# Patient Record
Sex: FEMALE | Race: WHITE | NOT HISPANIC OR LATINO | Employment: OTHER | URBAN - METROPOLITAN AREA
[De-identification: names, ages, dates, MRNs, and addresses within clinical notes are randomized per-mention and may not be internally consistent; named-entity substitution may affect disease eponyms.]

---

## 2018-09-21 ENCOUNTER — OFFICE VISIT (OUTPATIENT)
Dept: MEDICAL GROUP | Facility: MEDICAL CENTER | Age: 43
End: 2018-09-21
Attending: FAMILY MEDICINE
Payer: MEDICAID

## 2018-09-21 VITALS
OXYGEN SATURATION: 98 % | HEIGHT: 66 IN | SYSTOLIC BLOOD PRESSURE: 102 MMHG | TEMPERATURE: 97.3 F | BODY MASS INDEX: 24.75 KG/M2 | DIASTOLIC BLOOD PRESSURE: 60 MMHG | RESPIRATION RATE: 16 BRPM | WEIGHT: 154 LBS | HEART RATE: 84 BPM

## 2018-09-21 DIAGNOSIS — M25.551 RIGHT HIP PAIN: ICD-10-CM

## 2018-09-21 DIAGNOSIS — M54.50 CHRONIC LOW BACK PAIN WITHOUT SCIATICA, UNSPECIFIED BACK PAIN LATERALITY: ICD-10-CM

## 2018-09-21 DIAGNOSIS — N63.0 BREAST NODULE: ICD-10-CM

## 2018-09-21 DIAGNOSIS — G89.29 CHRONIC LOW BACK PAIN WITHOUT SCIATICA, UNSPECIFIED BACK PAIN LATERALITY: ICD-10-CM

## 2018-09-21 PROCEDURE — 99203 OFFICE O/P NEW LOW 30 MIN: CPT | Performed by: FAMILY MEDICINE

## 2018-09-21 PROCEDURE — 99204 OFFICE O/P NEW MOD 45 MIN: CPT | Performed by: FAMILY MEDICINE

## 2018-09-21 ASSESSMENT — PATIENT HEALTH QUESTIONNAIRE - PHQ9: CLINICAL INTERPRETATION OF PHQ2 SCORE: 0

## 2018-09-21 NOTE — ASSESSMENT & PLAN NOTE
Patient notes 3 month history of persistent achiness in the lateral posterior aspect of her right hip. There is no recent history of trauma. She does not express pain consistently radiating down her right leg.she is not experiencing numbness in the right leg.

## 2018-09-21 NOTE — PROGRESS NOTES
Chief Complaint   Patient presents with   • Establish Care   • Breast Mass   • Back Pain         HISTORY OF THE PRESENT ILLNESS: Patient is a 42 y.o. female. This pleasant patient is here today to establish care, and be evaluated for persistent lumbosacral pain, recent right hip pain, new onset left breast nodule      Chronic low back pain without sciatica  Patient reports a 20+ her history of persistent recurrent pain in her lumbar and sacral midline spinal areas. Tends to radiate towards her right buttock. She reports very rarely she might have brief burning on the anterior proximal right thigh area and she was evaluated with plain x-rays at age 23 and told that she had scoliosis. She also experiences low back muscle spasms. She was also told she had fairly severe arthritic change back when she was 23 with apparently even bone spur formation being observed. She also reports intermittent feelings of snapping in her lower spine area when she is walking. She does not have walker urinary incontinence but does have suggestion of stress urinary incontinence. There is no fecal incontinence.    Right hip pain  Patient notes 3 month history of persistent achiness in the lateral posterior aspect of her right hip. There is no recent history of trauma. She does not express pain consistently radiating down her right leg.she is not experiencing numbness in the right leg.    Breast nodule  Patient noted about 3 weeks ago a small pea-sized nodule in the upper part of her left breast just beyond the nipple. It has not significantly changed in size there has been no nipple discharge. Family history is notable for breast cancer in her mom. Patient does not notice any persistent breast tenderness other than menstrually related bilaterally.    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    Current Outpatient Prescriptions Ordered in [facility] [employer]   Medication Sig Dispense  Refill   • hydrocodone-acetaminophen (NORCO) 5-325 MG Tab per tablet Take 1 Tab by mouth every 6 hours as needed (for pain). Do not drive or drink alcohol or engaged in potentially hazardous activity while taking this medication 20 Tab 0     No current Epic-ordered facility-administered medications on file.        No past medical history on file.    Past Surgical History:   Procedure Laterality Date   • PRIMARY C SECTION      x3       Social History   Substance Use Topics   • Smoking status: Current Every Day Smoker     Packs/day: 0.75   • Smokeless tobacco: Never Used   • Alcohol use Yes      Comment: rarely       Family Status   Relation Status   • Mo (Not Specified)   • Fa (Not Specified)   • MUnc (Not Specified)   • Neg Hx (Not Specified)     Family History   Problem Relation Age of Onset   • Cancer Mother 42        breast   • Diabetes Mother    • Stroke Father    • Diabetes Maternal Uncle    • Heart Disease Neg Hx        Review of Systems   Constitutional: Negative for fever, chills, weight loss and malaise/fatigue.   HENT: Negative for ear pain, nosebleeds, congestion, sore throat and neck pain.    Eyes: Negative for blurred vision.   Respiratory: Negative for cough, sputum production, shortness of breath and wheezing.    Cardiovascular: Negative for chest pain, palpitations, orthopnea and leg swelling.   Gastrointestinal: Negative for heartburn, nausea, vomiting and abdominal pain.   Genitourinary: Negative for dysuria, urgency and frequency.   Musculoskeletal: Positive for right hip myalgia, back pain .   Skin: Negative for rash and itching.   Neurological: Negative for dizziness, tingling, tremors, sensory change, focal weakness and headaches.   Endo/Heme/Allergies: Does not bruise/bleed easily.   Psychiatric/Behavioral: Negative for depression, anxiety, or memory loss.            Exam: Blood pressure 102/60, pulse 84, temperature 36.3 °C (97.3 °F), temperature source Temporal, resp. rate 16, height 1.676 m  "(5' 6\"), weight 69.9 kg (154 lb), SpO2 98 %.  General: Normal appearing. No distress.  HEENT: Normocephalic. Eyes conjunctiva clear lids without ptosis, pupils equal and reactive to light accommodation, ears normal shape and contour, canals are clear bilaterally, tympanic membranes are benign, nasal mucosa benign, oropharynx is without erythema, edema or exudates.   Neck: Supple without JVD or bruit. Thyroid is not enlarged.  Pulmonary: Clear to ausculation.  Normal effort. No rales, ronchi, or wheezing.  Breast-4 mm subcutaneous nodule noted at 12:30 above the left areola. No overlying skin changes. No other palpable breast abnormality. No axillary adenopathy or tenderness either side  Cardiovascular: Regular rate and rhythm without murmur. Carotid and radial pulses are intact and equal bilaterally.  Abdomen: Soft, nontender, nondistended. Normal bowel sounds. Liver and spleen are not palpable  Neurologic: Intact light touch and strength bilaterally,normal speech, no tremor, normal gait.   Lymph: No cervical, supraclavicular or axillary lymph nodes are palpable  Skin: Warm and dry.  No obvious lesions.  Musculoskeletal: Normal gait. No extremity cyanosis, clubbing, or edema.  Psych: Normal mood and affect. Alert and oriented x3. Judgment and insight is normal.  Back-mildly tender in the midline from L4-S4. No palpable tenderness at the right SI joint this morning. 1+ tender over the lateral right hip  Please note that this dictation was created using voice recognition software. I have made every reasonable attempt to correct obvious errors, but I expect that there are errors of grammar and possibly content that I did not discover before finalizing the note.      Assessment/Plan  1. Breast nodule  MA-DIAGNOSTIC MAMMO W/O CAD-BILAT   2. Right hip pain  DX-HIP-BILATERAL-WITH PELVIS-2 VIEWS   3. Chronic low back pain without sciatica, unspecified back pain laterality  DX-LUMBAR SPINE-4+ VIEWS    REFERRAL TO PHYSICAL " THERAPY Reason for Therapy: Eval/Treat/Report     Plan: 1. LS-spine x-ray  2. Trial of physical therapy  3. Bilateral hip x-rays  4. Diagnostic mammogram  5. Revisit in 3 weeks

## 2018-09-21 NOTE — ASSESSMENT & PLAN NOTE
Patient noted about 3 weeks ago a small pea-sized nodule in the upper part of her left breast just beyond the nipple. It has not significantly changed in size there has been no nipple discharge. Family history is notable for breast cancer in her mom. Patient does not notice any persistent breast tenderness other than menstrually related bilaterally.

## 2018-09-21 NOTE — ASSESSMENT & PLAN NOTE
Patient reports a 20+ her history of persistent recurrent pain in her lumbar and sacral midline spinal areas. Tends to radiate towards her right buttock. She reports very rarely she might have brief burning on the anterior proximal right thigh area and she was evaluated with plain x-rays at age 23 and told that she had scoliosis. She also experiences low back muscle spasms. She was also told she had fairly severe arthritic change back when she was 23 with apparently even bone spur formation being observed. She also reports intermittent feelings of snapping in her lower spine area when she is walking. She does not have walker urinary incontinence but does have suggestion of stress urinary incontinence. There is no fecal incontinence.

## 2018-09-25 ENCOUNTER — HOSPITAL ENCOUNTER (OUTPATIENT)
Dept: RADIOLOGY | Facility: MEDICAL CENTER | Age: 43
End: 2018-09-25
Attending: FAMILY MEDICINE
Payer: MEDICAID

## 2018-09-25 DIAGNOSIS — M54.50 CHRONIC LOW BACK PAIN WITHOUT SCIATICA, UNSPECIFIED BACK PAIN LATERALITY: ICD-10-CM

## 2018-09-25 DIAGNOSIS — M25.551 RIGHT HIP PAIN: ICD-10-CM

## 2018-09-25 DIAGNOSIS — G89.29 CHRONIC LOW BACK PAIN WITHOUT SCIATICA, UNSPECIFIED BACK PAIN LATERALITY: ICD-10-CM

## 2018-09-25 PROCEDURE — 72110 X-RAY EXAM L-2 SPINE 4/>VWS: CPT

## 2018-09-25 PROCEDURE — 73521 X-RAY EXAM HIPS BI 2 VIEWS: CPT

## 2018-09-27 ENCOUNTER — HOSPITAL ENCOUNTER (OUTPATIENT)
Dept: RADIOLOGY | Facility: MEDICAL CENTER | Age: 43
End: 2018-09-27
Attending: FAMILY MEDICINE
Payer: MEDICAID

## 2018-09-27 DIAGNOSIS — N63.0 BREAST NODULE: ICD-10-CM

## 2018-09-27 PROCEDURE — G0279 TOMOSYNTHESIS, MAMMO: HCPCS

## 2018-09-27 PROCEDURE — 76642 ULTRASOUND BREAST LIMITED: CPT | Mod: RT

## 2018-09-28 ENCOUNTER — TELEPHONE (OUTPATIENT)
Dept: MEDICAL GROUP | Facility: MEDICAL CENTER | Age: 43
End: 2018-09-28

## 2018-09-28 NOTE — TELEPHONE ENCOUNTER
----- Message from Girma Vasques M.D. sent at 9/27/2018  6:22 PM PDT -----  Spine x-rays are notable for small joint arthritis with very slight movement of L1 on L2 which does not appear significant as a pain generator.

## 2018-09-28 NOTE — TELEPHONE ENCOUNTER
----- Message from Girma Vasques M.D. sent at 9/27/2018  6:20 PM PDT -----  Hip x-rays look unremarkable

## 2018-09-28 NOTE — TELEPHONE ENCOUNTER
1. Caller Name: Lizette Baez                                           Call Back Number: [phone] (home)         Patient approves a detailed voicemail message: yes    Left voice mail to call us back

## 2018-10-01 NOTE — TELEPHONE ENCOUNTER
Facet arthropathy means arthritic changes of the small joints that exist at each vertebral level in the spine

## 2018-10-02 ENCOUNTER — TELEPHONE (OUTPATIENT)
Dept: MEDICAL GROUP | Facility: MEDICAL CENTER | Age: 43
End: 2018-10-02

## 2018-10-02 NOTE — TELEPHONE ENCOUNTER
1. Caller Name: Lizette Baez                                           Call Back Number: [phone] (home)         Patient approves a detailed voicemail message: yes    Left message for Pt to call back

## 2018-10-02 NOTE — TELEPHONE ENCOUNTER
1. Caller Name: Lizette Baez                                         Call Back Number: [phone] (home)         Patient approves a detailed voicemail message: no    Left message for Pt to call back

## 2018-10-12 ENCOUNTER — OFFICE VISIT (OUTPATIENT)
Dept: MEDICAL GROUP | Facility: MEDICAL CENTER | Age: 43
End: 2018-10-12
Attending: FAMILY MEDICINE
Payer: MEDICAID

## 2018-10-12 VITALS
WEIGHT: 151 LBS | HEIGHT: 66 IN | DIASTOLIC BLOOD PRESSURE: 64 MMHG | BODY MASS INDEX: 24.27 KG/M2 | TEMPERATURE: 97.6 F | HEART RATE: 88 BPM | SYSTOLIC BLOOD PRESSURE: 112 MMHG | RESPIRATION RATE: 16 BRPM | OXYGEN SATURATION: 96 %

## 2018-10-12 DIAGNOSIS — G89.29 CHRONIC RIGHT-SIDED LOW BACK PAIN WITHOUT SCIATICA: ICD-10-CM

## 2018-10-12 DIAGNOSIS — M25.551 RIGHT HIP PAIN: ICD-10-CM

## 2018-10-12 DIAGNOSIS — M54.50 CHRONIC RIGHT-SIDED LOW BACK PAIN WITHOUT SCIATICA: ICD-10-CM

## 2018-10-12 PROCEDURE — 99213 OFFICE O/P EST LOW 20 MIN: CPT | Performed by: FAMILY MEDICINE

## 2018-10-12 RX ORDER — METHOCARBAMOL 500 MG/1
500 TABLET, FILM COATED ORAL 4 TIMES DAILY
Qty: 90 TAB | Refills: 2 | Status: SHIPPED | OUTPATIENT
Start: 2018-10-12 | End: 2019-10-16

## 2018-10-12 NOTE — PROGRESS NOTES
"Subjective:      Lizette Baez is a 42 y.o. female who presents with No chief complaint on file.            HPI 1. Chronic low back pain/hip pain-patient did complete her spine and hip x-rays which show modest elements of degenerative osteoarthritis with no severe disc space narrowing. She is currently taking Naprosyn. Negative for urinary incontinence    ROS negative for GI upset, black stools       Objective:     /64   Pulse 88   Temp 36.4 °C (97.6 °F) (Temporal)   Resp 16   Ht 1.676 m (5' 5.98\")   Wt 68.5 kg (151 lb)   SpO2 96%   BMI 24.38 kg/m²      Physical Exam  General-alert cooperative female in no acute distress  Back-slightly tender over the very far right lateral SI area. Tenderness is more focused in the very proximal lateral right hip area, not over the trochanteric bursa.          Assessment/Plan:     1. Chronic right-sided low back pain without sciatica      2. Right hip pain      Plan: 1. Physiatry referral  2. Trial of Robaxin 500 mg up to 3 times daily as needed  3. Patient may continue on Naprosyn  4. Patient is encouraged to pursue physical therapy appointment  5. Revisit with me one month  "

## 2018-10-15 ENCOUNTER — TELEPHONE (OUTPATIENT)
Dept: MEDICAL GROUP | Facility: MEDICAL CENTER | Age: 43
End: 2018-10-15

## 2018-10-15 NOTE — TELEPHONE ENCOUNTER
Lizette, no pain management office is contractual available in [place]. Let's pursue physical therapy for trying to send you to [place]. Dr. PADILLA

## 2018-10-22 ENCOUNTER — TELEPHONE (OUTPATIENT)
Dept: MEDICAL GROUP | Facility: MEDICAL CENTER | Age: 43
End: 2018-10-22

## 2018-10-22 RX ORDER — METHOCARBAMOL 750 MG/1
750 TABLET, FILM COATED ORAL 2 TIMES DAILY
Qty: 60 TAB | Refills: 1 | Status: SHIPPED | OUTPATIENT
Start: 2018-10-22 | End: 2019-10-16

## 2018-10-23 NOTE — TELEPHONE ENCOUNTER
Was the patient seen in the last year in this department? Yes    Does patient have an active prescription for medications requested? No     Received Request Via: Pharmacy, methocarbomal 500 mg is on back order. Please change strength or medication.     Thank you

## 2019-09-27 ENCOUNTER — HOSPITAL ENCOUNTER (OUTPATIENT)
Facility: MEDICAL CENTER | Age: 44
End: 2019-09-27
Attending: SPECIALIST | Admitting: SPECIALIST
Payer: MEDICAID

## 2019-10-09 NOTE — H&P
DATE OF SCHEDULED SURGERY:  10/29/2019    REASON FOR SURGERY:  Menometrorrhagia, dysmenorrhea, which she describes   incapacitating dyspareunia, symptomatic pelvic floor relaxation, mixed urinary   incontinence with strong type 2 stress urinary incontinence components noted   on urodynamic studies.    HISTORY OF PRESENT ILLNESS:  This is a 43-year-old  5, para 3,   therapeutic  2, with negative urine pregnancy test on 10/09/2019, who   states that she has exhausted all conservative measures and is now wishing to   proceed forward with attempted definitive surgical correction with a   laparoscopic-assisted vaginal hysterectomy, bilateral salpingectomy, native   tissue repair in the form of an anterior and posterior vaginal repair,   enterocele repair, perineoplasty, sacrospinous vault suspension,   transobturator tape midurethral sling procedure.  Risks, benefits, and   alternatives of all individual portions of the surgery were addressed with the   patient in detail over several visits.  She has asked appropriate questions,   signed the appropriate consents and wishes to proceed forward with surgery as   planned.  She does understand limitations of surgery in addressing her pelvic   pain, dyspareunia, and overactive bladder symptoms as she states that these   symptoms may be unimproved or actually worsen with time requiring additional   medical or surgical management, and even despite these limitations of surgery,   she is wishing to proceed forward with the scheduled surgery as planned on   10/29/2019.    PAST MEDICAL HISTORY:  Lumbago, otherwise as stated above.    PAST SURGICAL HISTORY:   sections in , , and .    OBSTETRICAL HISTORY:  The patient does report 3 previous deliveries, largest   infant 8 pounds between  and  and two therapeutic abortions.    GYNECOLOGIC HISTORY:  She reports completely irregular cycles, which she   describes excessive with passage of large  clots, describes incapacitating   dysmenorrhea, dyspareunia, pelvic pain, bulge at the vaginal introitus   consistent with cervix and anterior vaginal wall with overactive bladder   symptoms requiring daily pad therapy, not only for abnormal uterine bleeding,   but also for urinary incontinence.  She denies any sexually transmitted   diseases or pelvic infections.    FAMILY HISTORY:  Noncontributory.    REVIEW OF SYSTEMS:  Otherwise unremarkable.    SOCIAL HISTORY:  She is self-employed, .  She denies use of any   significant alcohol.  She gives a 20+ year history of tobacco use, currently   smoking a pack of cigarettes per day.  She denies any other drug use.    MEDICATIONS:  None.    ALLERGIES:  No known drug allergies.    PHYSICAL EXAMINATION  GENERAL:  She is afebrile, hemodynamically stable.  CURRENT VITAL SIGNS:  Can be seen in electronic medical record.  HEART:  Regular rate and rhythm.  CHEST:  Clear to auscultation bilaterally.  ABDOMEN:  Soft, nondistended, nontender.  Bowel sounds are present.  PELVIC:  Exam shows normal external female genitalia, small uterus, but   tenderness to palpation noted at the uterine fundus and bilateral adnexal   tenderness to palpation noted, left greater than right.  She has got a grade   II anterior posterior and uterine relaxation on bimanual examination.  EXTREMITIES:  Nontender.    LABORATORY DATA:  Showed negative urine pregnancy test on 10/09/2019.    Transvaginal pelvic ultrasound, for which she was referred from her primary   care provider for the above concerns in addition to a transvaginal pelvic   ultrasound suggestive of adenomyosis given a markedly heterogeneous uterine   echotexture with numerous dilated vessels and cystic spaces within the uterine   wall as well as multiple subendometrial cysts, which would be suggestive of   adenomyosis.  Urodynamic studies did demonstrate and confirm type 2 stress   urinary incontinence.  Urinalysis was within  normal limits.  Endometrial   biopsy showed no evidence of hyperplasia or malignancy.  Laboratory studies   show normal thyroid function study.  She was anemic with hemoglobin and   hematocrit of 11.4 and 34.9.    ASSESSMENT AND PLAN:  A 43-year-old woman  5, para 3 with pelvic pain,   dysmenorrhea, dyspareunia, abnormal uterine bleeding, symptomatic pelvic floor   relaxation, mixed urinary incontinence, refractory to multiple conservative   management, is declining any further attempts at controlling her abnormal   uterine bleeding with hormonal therapy given her uterovaginal prolapse and   failure of pelvic floor exercise therapy and need for daily pad therapy.  She   states that she wishes to proceed forward with the above surgery,   understanding the limitations of surgery and wishes to proceed forward with   the scheduled surgery on 10/29/2019.             ____________________________________     MD CADENCE JACK / LAUREL    DD:  10/09/2019 08:32:32  DT:  10/09/2019 09:22:33    D#:  [id number]  Job#:  [id number]

## 2019-10-16 VITALS — BODY MASS INDEX: 23.74 KG/M2 | WEIGHT: 147.71 LBS | HEIGHT: 66 IN

## 2019-10-16 DIAGNOSIS — Z01.812 PRE-OPERATIVE LABORATORY EXAMINATION: ICD-10-CM

## 2019-10-16 DIAGNOSIS — Z01.810 PRE-OPERATIVE CARDIOVASCULAR EXAMINATION: ICD-10-CM

## 2019-10-16 LAB
ANION GAP SERPL CALC-SCNC: 7 MMOL/L (ref 0–11.9)
BUN SERPL-MCNC: 11 MG/DL (ref 8–22)
CALCIUM SERPL-MCNC: 9.3 MG/DL (ref 8.5–10.5)
CHLORIDE SERPL-SCNC: 106 MMOL/L (ref 96–112)
CO2 SERPL-SCNC: 27 MMOL/L (ref 20–33)
CREAT SERPL-MCNC: 0.61 MG/DL (ref 0.5–1.4)
EKG IMPRESSION: NORMAL
ERYTHROCYTE [DISTWIDTH] IN BLOOD BY AUTOMATED COUNT: 44.5 FL (ref 35.9–50)
GLUCOSE SERPL-MCNC: 89 MG/DL (ref 65–99)
HCT VFR BLD AUTO: 36.8 % (ref 37–47)
HGB BLD-MCNC: 11.7 G/DL (ref 12–16)
MCH RBC QN AUTO: 28.1 PG (ref 27–33)
MCHC RBC AUTO-ENTMCNC: 31.8 G/DL (ref 33.6–35)
MCV RBC AUTO: 88.5 FL (ref 81.4–97.8)
PLATELET # BLD AUTO: 208 K/UL (ref 164–446)
PMV BLD AUTO: 11 FL (ref 9–12.9)
POTASSIUM SERPL-SCNC: 3.5 MMOL/L (ref 3.6–5.5)
RBC # BLD AUTO: 4.16 M/UL (ref 4.2–5.4)
SODIUM SERPL-SCNC: 140 MMOL/L (ref 135–145)
WBC # BLD AUTO: 6.6 K/UL (ref 4.8–10.8)

## 2019-10-16 PROCEDURE — 36415 COLL VENOUS BLD VENIPUNCTURE: CPT

## 2019-10-16 PROCEDURE — 93005 ELECTROCARDIOGRAM TRACING: CPT | Performed by: SPECIALIST

## 2019-10-16 PROCEDURE — 85027 COMPLETE CBC AUTOMATED: CPT

## 2019-10-16 PROCEDURE — 93010 ELECTROCARDIOGRAM REPORT: CPT | Performed by: INTERNAL MEDICINE

## 2019-10-16 PROCEDURE — 80048 BASIC METABOLIC PNL TOTAL CA: CPT

## 2019-10-16 NOTE — OR NURSING
At pre-registration appointment patient admitted to currently using methamphetamines. Notified Dr. Bree Giles's surgery scheduler.Tisha stated she would make sure  Dr. Giron is aware. Also notified anesthesia via fax.

## 2019-11-05 ENCOUNTER — HOSPITAL ENCOUNTER (OUTPATIENT)
Facility: MEDICAL CENTER | Age: 44
End: 2019-11-05
Attending: SPECIALIST | Admitting: SPECIALIST
Payer: MEDICAID

## 2021-04-21 ENCOUNTER — HOSPITAL ENCOUNTER (OUTPATIENT)
Facility: MEDICAL CENTER | Age: 46
End: 2021-04-21
Attending: PHYSICIAN ASSISTANT
Payer: MEDICAID

## 2021-04-21 ENCOUNTER — OFFICE VISIT (OUTPATIENT)
Dept: URGENT CARE | Facility: CLINIC | Age: 46
End: 2021-04-21
Payer: MEDICAID

## 2021-04-21 VITALS
DIASTOLIC BLOOD PRESSURE: 70 MMHG | WEIGHT: 154 LBS | BODY MASS INDEX: 24.75 KG/M2 | HEART RATE: 95 BPM | TEMPERATURE: 98.2 F | HEIGHT: 66 IN | RESPIRATION RATE: 16 BRPM | OXYGEN SATURATION: 99 % | SYSTOLIC BLOOD PRESSURE: 118 MMHG

## 2021-04-21 DIAGNOSIS — N76.0 ACUTE VAGINITIS: ICD-10-CM

## 2021-04-21 LAB
APPEARANCE UR: CLEAR
BILIRUB UR STRIP-MCNC: NEGATIVE MG/DL
COLOR UR AUTO: YELLOW
GLUCOSE UR STRIP.AUTO-MCNC: NEGATIVE MG/DL
INT CON NEG: NEGATIVE
INT CON POS: POSITIVE
KETONES UR STRIP.AUTO-MCNC: NEGATIVE MG/DL
LEUKOCYTE ESTERASE UR QL STRIP.AUTO: NEGATIVE
NITRITE UR QL STRIP.AUTO: NEGATIVE
PH UR STRIP.AUTO: 5.5 [PH] (ref 5–8)
POC URINE PREGNANCY TEST: NEGATIVE
PROT UR QL STRIP: NEGATIVE MG/DL
RBC UR QL AUTO: NEGATIVE
SP GR UR STRIP.AUTO: >=1.03
UROBILINOGEN UR STRIP-MCNC: NORMAL MG/DL

## 2021-04-21 PROCEDURE — 81002 URINALYSIS NONAUTO W/O SCOPE: CPT | Performed by: PHYSICIAN ASSISTANT

## 2021-04-21 PROCEDURE — 99213 OFFICE O/P EST LOW 20 MIN: CPT | Performed by: PHYSICIAN ASSISTANT

## 2021-04-21 PROCEDURE — 81025 URINE PREGNANCY TEST: CPT | Performed by: PHYSICIAN ASSISTANT

## 2021-04-22 LAB
FORWARD REASON: SPWHY: NORMAL
FORWARDED TO LAB: SPWHR: NORMAL
SPECIMEN SENT: SPWT1: NORMAL

## 2021-04-22 ASSESSMENT — ENCOUNTER SYMPTOMS
CHILLS: 0
NAUSEA: 0
VAGINITIS: 1
HEADACHES: 0
ABDOMINAL PAIN: 0
DIZZINESS: 0
FEVER: 0
VOMITING: 0

## 2021-04-22 NOTE — PROGRESS NOTES
Subjective:      Lizette Baez is a 45 y.o. female who presents with Vaginal Pain (x5 days tampon stuck, possible infection. Dizzy. Stomach pain. odor. Taken out on . )            Vaginitis  The patient's primary symptoms include a genital odor. The patient's pertinent negatives include no pelvic pain or vaginal discharge. Primary symptoms comment: genital odor after noticing a tampon had been left in for 5 days.. This is a new problem. The current episode started in the past 7 days. The problem occurs constantly. The problem has been unchanged. Pertinent negatives include no abdominal pain, chills, dysuria, fever, frequency, headaches, hematuria, nausea, rash, urgency or vomiting. Nothing aggravates the symptoms. She has tried nothing for the symptoms. She is sexually active. No, her partner does not have an STD. Her menstrual history has been regular.     Past Medical History:   Diagnosis Date   • Anemia    • Anesthesia     post op n/v   • Arthritis     osteo/back   • Dental disorder     upper/lower dentures   • Dysmenorrhea    • Female stress incontinence        Past Surgical History:   Procedure Laterality Date   • PRIMARY C SECTION      x3   • PRIMARY C SECTION  /           Family History   Problem Relation Age of Onset   • Cancer Mother 42        breast   • Diabetes Mother    • Stroke Father    • Diabetes Maternal Uncle    • Heart Disease Neg Hx        Allergies   Allergen Reactions   • Pcn [Penicillins] Vomiting and Nausea     Medications, Allergies, and current problem list reviewed today in Epic    .  Review of Systems   Constitutional: Negative for chills and fever.   Cardiovascular: Negative for chest pain.   Gastrointestinal: Negative for abdominal pain, nausea and vomiting.   Genitourinary: Negative for dysuria, frequency, hematuria, pelvic pain, urgency and vaginal discharge.        No vaginal discharge, pelvic pain or pelvic bleeding. Vaginal odor.   Skin: Negative for  "itching and rash.   Neurological: Negative for dizziness and headaches.     All other systems reviewed and are negative.        Objective:     /70   Pulse 95   Temp 36.8 °C (98.2 °F) (Temporal)   Resp 16   Ht 1.676 m (5' 6\")   Wt 69.9 kg (154 lb)   SpO2 99%   BMI 24.86 kg/m²      Physical Exam  Constitutional:       General: She is not in acute distress.     Appearance: She is not ill-appearing.   HENT:      Head: Normocephalic and atraumatic.   Eyes:      Conjunctiva/sclera: Conjunctivae normal.   Cardiovascular:      Rate and Rhythm: Normal rate and regular rhythm.   Pulmonary:      Effort: Pulmonary effort is normal. No respiratory distress.      Breath sounds: Normal breath sounds. No wheezing.   Genitourinary:     Comments: deferred  Skin:     General: Skin is warm and dry.   Neurological:      General: No focal deficit present.      Mental Status: She is alert and oriented to person, place, and time.   Psychiatric:         Mood and Affect: Mood normal.         Behavior: Behavior normal.         Thought Content: Thought content normal.         Judgment: Judgment normal.       Lab Results   Component Value Date/Time    POCCOLOR YELLOW 04/21/2021 09:07 PM    POCAPPEAR CLEAR 04/21/2021 09:07 PM    POCLEUKEST NEGATIVE 04/21/2021 09:07 PM    POCNITRITE NEGATIVE 04/21/2021 09:07 PM    POCUROBILIGE 1.0 E.U./dl 04/21/2021 09:07 PM    POCPROTEIN NEGATIVE 04/21/2021 09:07 PM    POCURPH 5.5 04/21/2021 09:07 PM    POCBLOOD NEGATIVE 04/21/2021 09:07 PM    POCSPGRV >=1.030 04/21/2021 09:07 PM    POCKETONES NEGATIVE 04/21/2021 09:07 PM    POCBILIRUBIN NEGATIVE 04/21/2021 09:07 PM    POCGLUCUA NEGATIVE 04/21/2021 09:07 PM      HCG- negative          Assessment/Plan:        1. Acute vaginitis    - VAGINAL PATHOGENS DNA PANEL; Future  - POCT Urinalysis  - POCT Pregnancy    No signs or symptoms of Toxic shock.  Check vaginal pathogens above and change treatment plan accordingly.    Differential diagnoses, Supportive " care, and indications for immediate follow-up discussed with patient.   Pathogenesis of diagnosis discussed including typical length and natural progression.   Instructed to return to clinic or nearest emergency department for any change in condition, further concerns, or worsening of symptoms.  The patient demonstrated a good understanding and agreed with the treatment plan.    Alison Sampson P.A.-C.

## 2021-04-26 ENCOUNTER — TELEPHONE (OUTPATIENT)
Dept: URGENT CARE | Facility: CLINIC | Age: 46
End: 2021-04-26

## 2021-04-26 DIAGNOSIS — B96.89 BV (BACTERIAL VAGINOSIS): ICD-10-CM

## 2021-04-26 DIAGNOSIS — N76.0 BV (BACTERIAL VAGINOSIS): ICD-10-CM

## 2021-04-26 RX ORDER — METRONIDAZOLE 500 MG/1
500 TABLET ORAL 2 TIMES DAILY
Qty: 14 TABLET | Refills: 0 | Status: SHIPPED | OUTPATIENT
Start: 2021-04-26 | End: 2021-05-03

## 2021-09-13 ENCOUNTER — TELEMEDICINE (OUTPATIENT)
Dept: TELEHEALTH | Facility: TELEMEDICINE | Age: 46
End: 2021-09-13
Payer: MEDICAID

## 2021-09-13 DIAGNOSIS — B96.89 BACTERIAL VAGINOSIS: ICD-10-CM

## 2021-09-13 DIAGNOSIS — N76.0 BACTERIAL VAGINOSIS: ICD-10-CM

## 2021-09-13 PROCEDURE — 99213 OFFICE O/P EST LOW 20 MIN: CPT | Mod: 95 | Performed by: NURSE PRACTITIONER

## 2021-09-13 RX ORDER — METRONIDAZOLE 500 MG/1
500 TABLET ORAL
Qty: 14 TABLET | Refills: 0 | Status: SHIPPED | OUTPATIENT
Start: 2021-09-13 | End: 2021-09-20

## 2021-09-13 ASSESSMENT — ENCOUNTER SYMPTOMS
CHILLS: 0
FEVER: 0
ABDOMINAL PAIN: 0

## 2021-09-13 NOTE — PROGRESS NOTES
Subjective     Lizette Baez is a 45 y.o. female who presents with No chief complaint on file.            Lizette presents for a virtual visit in [place].  Identification was verified.  Patient was informed that encounter would be conducted over [employer], a secure, encrypted network and consent was obtained.  She reports a 2-3 day history of vaginal odor with fishy characteristics.  She has had BV in the past and reports the same symptoms presently.  She denies any genital rash, lesion, pain or vaginal discharge.  She was taking a daily vaginal probiotic but recently stopped.  She has had BV approximately 5 times, most recently 6 months ago.  She denies any suspected STI.        Review of Systems   Constitutional: Negative for chills, fever and malaise/fatigue.   Gastrointestinal: Negative for abdominal pain.   Genitourinary: Negative for dysuria.     Medications, Allergies, and current problem list reviewed today in Epic         Objective     There were no vitals taken for this visit.     Physical Exam  Constitutional:       General: She is not in acute distress.     Appearance: Normal appearance. She is not ill-appearing, toxic-appearing or diaphoretic.   Pulmonary:      Effort: Pulmonary effort is normal.   Abdominal:      Tenderness: There is no abdominal tenderness.   Neurological:      Mental Status: She is alert and oriented to person, place, and time.   Psychiatric:         Mood and Affect: Mood normal.                             Assessment & Plan        1. Bacterial vaginosis  - metroNIDAZOLE (FLAGYL) 500 MG Tab; Take 1 Tablet by mouth 2 times a day for 7 days.  Dispense: 14 Tablet; Refill: 0    Discussed limitations of virtual exam with Lizette; she does not wish to be seen in person for further evaluation and vaginal swab testing.  Differential reviewed.  Metronidazole as prescribed.  Avoid alcohol while taking this medication.  Follow up in person in 1 week if symptoms persist, sooner if worse.  Resume daily  probiotic.  Follow up with PCP and gynecology for routine health evaluation, screening, and maintenance.  She verbalized understanding of and agreed with plan of care.

## 2022-05-19 ENCOUNTER — HOSPITAL ENCOUNTER (OUTPATIENT)
Dept: RADIOLOGY | Facility: MEDICAL CENTER | Age: 47
End: 2022-05-19
Attending: NURSE PRACTITIONER
Payer: COMMERCIAL

## 2022-05-20 ENCOUNTER — HOSPITAL ENCOUNTER (OUTPATIENT)
Dept: RADIOLOGY | Facility: MEDICAL CENTER | Age: 47
End: 2022-05-20
Attending: FAMILY MEDICINE
Payer: COMMERCIAL

## 2022-05-20 DIAGNOSIS — Z12.31 VISIT FOR SCREENING MAMMOGRAM: ICD-10-CM

## 2022-05-20 PROCEDURE — 77063 BREAST TOMOSYNTHESIS BI: CPT

## 2022-05-24 ENCOUNTER — TELEPHONE (OUTPATIENT)
Dept: MEDICAL GROUP | Facility: MEDICAL CENTER | Age: 47
End: 2022-05-24
Payer: COMMERCIAL

## 2022-05-24 NOTE — TELEPHONE ENCOUNTER
Phone Number Called: [phone] (home) [phone] (work)      Call outcome: Spoke to patient regarding message below.    Message: patient has been infromed and understood no following question.----- Message from Girma Vasques M.D. sent at 5/23/2022  1:02 PM PDT -----  Mammogram results are normal. Please repeat exam in 1 year.

## 2022-06-02 SDOH — ECONOMIC STABILITY: INCOME INSECURITY: IN THE LAST 12 MONTHS, WAS THERE A TIME WHEN YOU WERE NOT ABLE TO PAY THE MORTGAGE OR RENT ON TIME?: NO

## 2022-06-02 SDOH — ECONOMIC STABILITY: HOUSING INSECURITY
IN THE LAST 12 MONTHS, WAS THERE A TIME WHEN YOU DID NOT HAVE A STEADY PLACE TO SLEEP OR SLEPT IN A SHELTER (INCLUDING NOW)?: NO

## 2022-06-02 SDOH — HEALTH STABILITY: PHYSICAL HEALTH: ON AVERAGE, HOW MANY DAYS PER WEEK DO YOU ENGAGE IN MODERATE TO STRENUOUS EXERCISE (LIKE A BRISK WALK)?: 2 DAYS

## 2022-06-02 SDOH — ECONOMIC STABILITY: TRANSPORTATION INSECURITY
IN THE PAST 12 MONTHS, HAS THE LACK OF TRANSPORTATION KEPT YOU FROM MEDICAL APPOINTMENTS OR FROM GETTING MEDICATIONS?: NO

## 2022-06-02 SDOH — ECONOMIC STABILITY: HOUSING INSECURITY: IN THE LAST 12 MONTHS, HOW MANY PLACES HAVE YOU LIVED?: 1

## 2022-06-02 SDOH — ECONOMIC STABILITY: FOOD INSECURITY: WITHIN THE PAST 12 MONTHS, YOU WORRIED THAT YOUR FOOD WOULD RUN OUT BEFORE YOU GOT MONEY TO BUY MORE.: NEVER TRUE

## 2022-06-02 SDOH — ECONOMIC STABILITY: TRANSPORTATION INSECURITY
IN THE PAST 12 MONTHS, HAS LACK OF RELIABLE TRANSPORTATION KEPT YOU FROM MEDICAL APPOINTMENTS, MEETINGS, WORK OR FROM GETTING THINGS NEEDED FOR DAILY LIVING?: NO

## 2022-06-02 SDOH — ECONOMIC STABILITY: FOOD INSECURITY: WITHIN THE PAST 12 MONTHS, THE FOOD YOU BOUGHT JUST DIDN'T LAST AND YOU DIDN'T HAVE MONEY TO GET MORE.: PATIENT DECLINED

## 2022-06-02 SDOH — ECONOMIC STABILITY: INCOME INSECURITY: HOW HARD IS IT FOR YOU TO PAY FOR THE VERY BASICS LIKE FOOD, HOUSING, MEDICAL CARE, AND HEATING?: NOT VERY HARD

## 2022-06-02 SDOH — ECONOMIC STABILITY: TRANSPORTATION INSECURITY
IN THE PAST 12 MONTHS, HAS LACK OF TRANSPORTATION KEPT YOU FROM MEETINGS, WORK, OR FROM GETTING THINGS NEEDED FOR DAILY LIVING?: NO

## 2022-06-02 SDOH — HEALTH STABILITY: PHYSICAL HEALTH: ON AVERAGE, HOW MANY MINUTES DO YOU ENGAGE IN EXERCISE AT THIS LEVEL?: 40 MIN

## 2022-06-02 SDOH — HEALTH STABILITY: MENTAL HEALTH

## 2022-06-02 ASSESSMENT — SOCIAL DETERMINANTS OF HEALTH (SDOH)
HOW OFTEN DO YOU GET TOGETHER WITH FRIENDS OR RELATIVES?: TWICE A WEEK
ARE YOU MARRIED, WIDOWED, DIVORCED, SEPARATED, NEVER MARRIED, OR LIVING WITH A PARTNER?: LIVING WITH PARTNER
HOW OFTEN DO YOU ATTENT MEETINGS OF THE CLUB OR ORGANIZATION YOU BELONG TO?: NEVER
HOW OFTEN DO YOU HAVE A DRINK CONTAINING ALCOHOL: 2-4 TIMES A MONTH
HOW OFTEN DO YOU ATTEND CHURCH OR RELIGIOUS SERVICES?: NEVER
HOW OFTEN DO YOU ATTEND CHURCH OR RELIGIOUS SERVICES?: NEVER
IN A TYPICAL WEEK, HOW MANY TIMES DO YOU TALK ON THE PHONE WITH FAMILY, FRIENDS, OR NEIGHBORS?: MORE THAN THREE TIMES A WEEK
WITHIN THE PAST 12 MONTHS, YOU WORRIED THAT YOUR FOOD WOULD RUN OUT BEFORE YOU GOT THE MONEY TO BUY MORE: NEVER TRUE
ARE YOU MARRIED, WIDOWED, DIVORCED, SEPARATED, NEVER MARRIED, OR LIVING WITH A PARTNER?: LIVING WITH PARTNER
DO YOU BELONG TO ANY CLUBS OR ORGANIZATIONS SUCH AS CHURCH GROUPS UNIONS, FRATERNAL OR ATHLETIC GROUPS, OR SCHOOL GROUPS?: NO
HOW HARD IS IT FOR YOU TO PAY FOR THE VERY BASICS LIKE FOOD, HOUSING, MEDICAL CARE, AND HEATING?: NOT VERY HARD
HOW OFTEN DO YOU HAVE SIX OR MORE DRINKS ON ONE OCCASION: NEVER
HOW MANY DRINKS CONTAINING ALCOHOL DO YOU HAVE ON A TYPICAL DAY WHEN YOU ARE DRINKING: 1 OR 2
HOW OFTEN DO YOU GET TOGETHER WITH FRIENDS OR RELATIVES?: TWICE A WEEK
DO YOU BELONG TO ANY CLUBS OR ORGANIZATIONS SUCH AS CHURCH GROUPS UNIONS, FRATERNAL OR ATHLETIC GROUPS, OR SCHOOL GROUPS?: NO
IN A TYPICAL WEEK, HOW MANY TIMES DO YOU TALK ON THE PHONE WITH FAMILY, FRIENDS, OR NEIGHBORS?: MORE THAN THREE TIMES A WEEK
HOW OFTEN DO YOU ATTENT MEETINGS OF THE CLUB OR ORGANIZATION YOU BELONG TO?: NEVER

## 2022-06-02 ASSESSMENT — LIFESTYLE VARIABLES
HOW OFTEN DO YOU HAVE A DRINK CONTAINING ALCOHOL: 2-4 TIMES A MONTH
AUDIT-C TOTAL SCORE: 2
HOW OFTEN DO YOU HAVE SIX OR MORE DRINKS ON ONE OCCASION: NEVER
HOW MANY STANDARD DRINKS CONTAINING ALCOHOL DO YOU HAVE ON A TYPICAL DAY: 1 OR 2
SKIP TO QUESTIONS 9-10: 1

## 2022-06-03 ENCOUNTER — OFFICE VISIT (OUTPATIENT)
Dept: INTERNAL MEDICINE | Facility: OTHER | Age: 47
End: 2022-06-03
Payer: COMMERCIAL

## 2022-06-03 VITALS
HEART RATE: 103 BPM | OXYGEN SATURATION: 96 % | WEIGHT: 165 LBS | DIASTOLIC BLOOD PRESSURE: 67 MMHG | HEIGHT: 65 IN | SYSTOLIC BLOOD PRESSURE: 107 MMHG | BODY MASS INDEX: 27.49 KG/M2 | TEMPERATURE: 97.7 F

## 2022-06-03 DIAGNOSIS — R29.898 LEG HEAVINESS: ICD-10-CM

## 2022-06-03 DIAGNOSIS — K21.9 GASTROESOPHAGEAL REFLUX DISEASE WITHOUT ESOPHAGITIS: ICD-10-CM

## 2022-06-03 DIAGNOSIS — R53.83 FATIGUE, UNSPECIFIED TYPE: ICD-10-CM

## 2022-06-03 DIAGNOSIS — Z12.4 SCREENING FOR CERVICAL CANCER: ICD-10-CM

## 2022-06-03 DIAGNOSIS — J35.1 TONSILLAR ENLARGEMENT: ICD-10-CM

## 2022-06-03 PROCEDURE — 99204 OFFICE O/P NEW MOD 45 MIN: CPT | Mod: GC | Performed by: STUDENT IN AN ORGANIZED HEALTH CARE EDUCATION/TRAINING PROGRAM

## 2022-06-03 RX ORDER — FAMOTIDINE 20 MG/1
20 TABLET, FILM COATED ORAL 2 TIMES DAILY
Qty: 60 TABLET | Refills: 1 | Status: SHIPPED
Start: 2022-06-03 | End: 2022-06-06 | Stop reason: CLARIF

## 2022-06-03 ASSESSMENT — PATIENT HEALTH QUESTIONNAIRE - PHQ9: CLINICAL INTERPRETATION OF PHQ2 SCORE: 0

## 2022-06-03 NOTE — PATIENT INSTRUCTIONS
-gets done and f/u I  week to discuss labs and consider pap smear.  - ent referal given.  -use pepcid for acid reflux and gasex  -if you notice leg welling or sob lets us know asap

## 2022-06-03 NOTE — PROGRESS NOTES
HPI: 46-year-old lady with a past medical history of anemia and COVID-19 who is here today to establish care with us.    -Patient reports several month history of fatigue and unintentional weight gain.  She states that she feels tired all the time, occasionally constipated as well.  She reports heavy menstrual cycle occurring every 3 weeks about 4 to 5 days.  She is requesting thyroid labs checked at this time.  -She also reports acid taste in the throat with tonsillar enlargement over the last 6 months.  She reports that gas sensation and bloating as well.  She denies any melena or hematochezia at this time no NSAID use or alcohol use.  -Patient reports left leg heaviness over the last 2 weeks with no associated swelling or tenderness, she denies any long distance travel.  She reports that she is physically active.  No shortness of breath or chest pain associated with this.  No prior prior history of blood clots or family history of blood clots.  -Patient states that she has had several episodes of bacterial vaginosis in the past and has not had a Pap smear in the last 3 years.  Currently no active discharge but occasionally she intermittently she does notice watery discharge.  However she says since COVID she has lost her smell and taste and is unable to know if she is having any foul-smelling discharge at this time.  -Review of systems is otherwise negative, mammogram within the last 6 months is within normal limits.      Current medicines (including changes today)  Current Outpatient Medications   Medication Sig Dispense Refill   • famotidine (PEPCID) 20 MG Tab Take 1 Tablet by mouth 2 times a day. 60 Tablet 1     No current facility-administered medications for this visit.     She  has a past medical history of Anemia, Anesthesia, Arthritis, Dental disorder, Dysmenorrhea, and Female stress incontinence.  She  has a past surgical history that includes primary c section and primary c section  "(2005/2012).  Social History     Tobacco Use   • Smoking status: Current Every Day Smoker     Packs/day: 1.00     Types: Cigarettes   • Smokeless tobacco: Never Used   Vaping Use   • Vaping Use: Never used   Substance Use Topics   • Alcohol use: Not Currently     Comment: denies   • Drug use: Yes     Types: Marijuana, Inhaled     Comment: marijuana/ meth     Social History     Social History Narrative   • Not on file     Family History   Problem Relation Age of Onset   • Cancer Mother 42        breast   • Diabetes Mother    • Stroke Father    • Diabetes Maternal Uncle    • Heart Disease Neg Hx      Family Status   Relation Name Status   • Mo  (Not Specified)   • Fa  (Not Specified)   • MUnc  (Not Specified)   • Neg Hx  (Not Specified)       ROS  See HPI     Objective:     Physical Exam:  /67 (BP Location: Right arm, Patient Position: Sitting, BP Cuff Size: Adult long)   Pulse (!) 103   Temp 36.5 °C (97.7 °F) (Temporal)   Ht 1.638 m (5' 4.5\")   Wt 74.8 kg (165 lb)   SpO2 96%  Body mass index is 27.88 kg/m².  Constitutional: Alert. Well appearing. No distress.  Skin: Warm, dry, good turgor, no visible rashes.  Eye: Equal, round and reactive to light, conjunctiva clear, lids normal.  ENMT: Moist mucous membranes. Normal dentition. Oropharynx clear. Right tonsil enlargement without exudate.  Neck: Trachea midline, no masses, no thyromegaly. No cervical or supraclavicular lymphadenopathy.  Respiratory: Normal effort. Lungs are clear to auscultation bilaterally.  Cardiovascular: Regular rate and rhythm. Normal S1/S2. No murmurs, rubs or gallops.   Abdomen: Soft, non-tender, non-distended. No masses, no hepatosplenomegaly.  Neuro: Moves all four extremities. No facial droop.    Musculoskeletal :no pain or tenderness palpation of the calf , no pain or erythema.  Psych: Answers questions appropriately. Normal affect and mood.      Assessment and Plan:     1. Fatigue, unspecified type  Patient reports fatigue of " several months, associated with unintentional weight gain.  She also reports having menstrual cycles associated with this.  She does report a history of anemia in the past as well.    Will check CBC, iron panel, TSH, vitamin B12, vitamin D.  -Follow-up within 1 week to review labs and address appropriately      2. Gastroesophageal reflux disease without esophagitis  Patient reports symptoms of acid reflux and acid taste in the throat associated with symptoms of dyspepsia like bloating.  -Will try famotidine for 4 weeks and reassess if no improvement will consider H. pylori testing  - Comp Metabolic Panel; Future    3. Tonsillar enlargement  Patient reports 6 months duration of tonsillar swelling.  States that at urgent care rapid strep test was negative.  She does not have any fevers associated with this no erythema on exam.  Right greater than left tonsillar enlargement noted.  - Referral to ENT    4. Screening for cervical cancer  Patient reports history of vascular vaginosis in the past and is currently due for a Pap smear.  -Patient to schedule a follow-up visit within 1 week for Pap smear and testing for vaginal infections.    5.  Leg heaviness: Patient reports to weeks history of left-sided leg heaviness no swelling, pain with dorsiflexion of the foot, no history of blood clots, no history of long distance travel.  Patient is pretty active.  -Low suspicion for DVT at this time, most likely appears to be restless legs.  -Check iron panel and follow-up within 1 week  Records requested from previous PCP.  Follow up: Return in about 1 week (around 6/10/2022).         PLEASE NOTE: This note was created using voice recognition software.

## 2022-06-04 ENCOUNTER — HOSPITAL ENCOUNTER (OUTPATIENT)
Dept: LAB | Facility: MEDICAL CENTER | Age: 47
End: 2022-06-04
Attending: STUDENT IN AN ORGANIZED HEALTH CARE EDUCATION/TRAINING PROGRAM
Payer: COMMERCIAL

## 2022-06-04 DIAGNOSIS — K21.9 GASTROESOPHAGEAL REFLUX DISEASE WITHOUT ESOPHAGITIS: ICD-10-CM

## 2022-06-04 DIAGNOSIS — R53.83 FATIGUE, UNSPECIFIED TYPE: ICD-10-CM

## 2022-06-04 LAB
25(OH)D3 SERPL-MCNC: 21 NG/ML (ref 30–100)
ERYTHROCYTE [DISTWIDTH] IN BLOOD BY AUTOMATED COUNT: 42.3 FL (ref 35.9–50)
FERRITIN SERPL-MCNC: 7.2 NG/ML (ref 10–291)
HCT VFR BLD AUTO: 42.4 % (ref 37–47)
HGB BLD-MCNC: 13.7 G/DL (ref 12–16)
IRON SATN MFR SERPL: 9 % (ref 15–55)
IRON SERPL-MCNC: 38 UG/DL (ref 40–170)
MAGNESIUM SERPL-MCNC: 1.8 MG/DL (ref 1.5–2.5)
MCH RBC QN AUTO: 27.8 PG (ref 27–33)
MCHC RBC AUTO-ENTMCNC: 32.3 G/DL (ref 33.6–35)
MCV RBC AUTO: 86 FL (ref 81.4–97.8)
PLATELET # BLD AUTO: 254 K/UL (ref 164–446)
PMV BLD AUTO: 11.2 FL (ref 9–12.9)
RBC # BLD AUTO: 4.93 M/UL (ref 4.2–5.4)
TIBC SERPL-MCNC: 403 UG/DL (ref 250–450)
TSH SERPL DL<=0.005 MIU/L-ACNC: 1.74 UIU/ML (ref 0.38–5.33)
UIBC SERPL-MCNC: 365 UG/DL (ref 110–370)
WBC # BLD AUTO: 8.4 K/UL (ref 4.8–10.8)

## 2022-06-04 PROCEDURE — 83540 ASSAY OF IRON: CPT

## 2022-06-04 PROCEDURE — 83550 IRON BINDING TEST: CPT

## 2022-06-04 PROCEDURE — 36415 COLL VENOUS BLD VENIPUNCTURE: CPT

## 2022-06-04 PROCEDURE — 85027 COMPLETE CBC AUTOMATED: CPT

## 2022-06-04 PROCEDURE — 84443 ASSAY THYROID STIM HORMONE: CPT

## 2022-06-04 PROCEDURE — 82728 ASSAY OF FERRITIN: CPT

## 2022-06-04 PROCEDURE — 82306 VITAMIN D 25 HYDROXY: CPT

## 2022-06-04 PROCEDURE — 83735 ASSAY OF MAGNESIUM: CPT

## 2022-06-06 ENCOUNTER — OFFICE VISIT (OUTPATIENT)
Dept: INTERNAL MEDICINE | Facility: OTHER | Age: 47
End: 2022-06-06
Payer: COMMERCIAL

## 2022-06-06 ENCOUNTER — TELEPHONE (OUTPATIENT)
Dept: HOSPITALIST | Facility: MEDICAL CENTER | Age: 47
End: 2022-06-06

## 2022-06-06 VITALS
SYSTOLIC BLOOD PRESSURE: 124 MMHG | WEIGHT: 167 LBS | BODY MASS INDEX: 28.51 KG/M2 | HEART RATE: 89 BPM | OXYGEN SATURATION: 98 % | HEIGHT: 64 IN | TEMPERATURE: 99.1 F | DIASTOLIC BLOOD PRESSURE: 70 MMHG

## 2022-06-06 DIAGNOSIS — E61.1 IRON DEFICIENCY: ICD-10-CM

## 2022-06-06 DIAGNOSIS — Z13.228 SCREENING FOR METABOLIC DISORDER: ICD-10-CM

## 2022-06-06 DIAGNOSIS — R53.83 OTHER FATIGUE: ICD-10-CM

## 2022-06-06 PROBLEM — Z00.00 HEALTH CARE MAINTENANCE: Status: ACTIVE | Noted: 2022-06-06

## 2022-06-06 PROBLEM — E55.9 VITAMIN D DEFICIENCY: Status: ACTIVE | Noted: 2022-06-06

## 2022-06-06 PROCEDURE — 99213 OFFICE O/P EST LOW 20 MIN: CPT | Mod: GE | Performed by: STUDENT IN AN ORGANIZED HEALTH CARE EDUCATION/TRAINING PROGRAM

## 2022-06-06 RX ORDER — FERROUS SULFATE 325(65) MG
325 TABLET ORAL
Qty: 30 TABLET | Refills: 1 | Status: SHIPPED | OUTPATIENT
Start: 2022-06-06 | End: 2022-12-27 | Stop reason: SDUPTHER

## 2022-06-06 NOTE — PROGRESS NOTES
Established Patient    Chief Complaint   Patient presents with   • Follow-Up     fatigue     HPI: Lizette Baez is a 46 y.o. female with past medical history of anemia, COVID-19 infection October 2021, who presented to the clinic for the following.    Recent labs done on 6/4/22 reviewed and discussed with the patient.  CBC: MCHC low at 32.3, hemoglobin 13.7-WNL  Iron studies: Low iron at 38, percentage saturation at 9, low ferritin at 7.2  Vitamin D: 21, low  TSH: 1.74, WNL  Did not get CMP done because of non-fasting    #Fatigue: Patient reports continued fatigue.  She reports snoring at night recently.  Reports excessive daytime fatigue.  She has gained 17 pounds in the last 6 months.  Denies doing any exercise other than working 4 hours a day for work.  Admits to taking a lot of sugars in the diet and drinking multiple cans of soda daily.    She has periods every 3 to 4 weeks, bleeds for 5 days.  Denies any dark-colored stools or blood in the stools.  Denies any acute symptoms at this time.    Patient Active Problem List    Diagnosis Date Noted   • Chronic low back pain without sciatica 09/21/2018   • Right hip pain 09/21/2018   • Breast nodule 09/21/2018       Current Outpatient Medications   Medication Sig Dispense Refill   • famotidine (PEPCID) 20 MG Tab Take 1 Tablet by mouth 2 times a day. 60 Tablet 1     No current facility-administered medications for this visit.       ROS: As per HPI. Additional pertinent systems as noted below.  Constitutional: Positive for fatigue.  Negative for fever, chills   HENT:  Negative for ear pain, sore throat, runny nose   Eyes:  Negative for blurred vision and double vision   Cardiovascular:  Negative for chest pain, palpitations   Respiratory:  Negative for cough, sputum production, shortness of breath   Gastrointestinal: Negative for abdominal pain, nausea, vomiting, diarrhea  Genitourinary: Negative for dysuria, flank pain and hematuria  Skin: Negative for rash,  "itching  Extremities: Negative for leg swelling   Neurologic: Negative for headaches, dizziness, seizures  Endo/Heme/Allergies: Negative for polydipsia. Does not bruise/bleed easily  Psychiatric: Negative for suicidal or homicidal ideas     /70 (BP Location: Left arm, Patient Position: Sitting, BP Cuff Size: Adult)   Pulse 89   Temp 37.3 °C (99.1 °F) (Temporal)   Ht 1.626 m (5' 4\")   Wt 75.8 kg (167 lb)   SpO2 98%   BMI 28.67 kg/m²     Physical Exam   Constitutional:  Well developed, BMI 28. Not in acute distress   HENT:  Normocephalic, Atraumatic, Oropharynx is pink and moist. No oral exudates, Nose normal   Eyes: Conjunctiva normal, No discharge or pallor   Neck:  Normal range of motion  Cardiovascular:  Regular rate and rhythm, No pedal edema, Intact distal pulses   Respiratory: Clear to auscultation bilaterally, No use of accessory muscles   Skin: Warm, Dry, No erythema, No rash, no induration.   Musculoskeletal: No cyanosis or clubbing, normal ROM   Neurologic: Alert & oriented x 4, No focal deficits noted  Psychiatric: Judgment normal, Mood normal, Memory normal     Note: I have reviewed all pertinent labs and diagnostic tests associated with this visit with specific comments listed under the assessment and plan below    Assessment and Plan  #Iron deficiency  -History of anemia, frequent and heavy menstrual bleeding every 3 to 4 weeks, last for 5 days.  Denies any melena or hematochezia.  Not started on Pepcid  -6/4: Hemoglobin -13.7, low iron, normal TIBC, low percent saturation and low ferritin  -Repeat labs for CBC   -Started on iron supplementation every other day p.o.  -Follow-up with Dr. Jaquez as scheduled with labs  -Due for Pap smear  -Consider GI referral for EGD/colonoscopy depending on repeat labs    #Vitamin D deficiency  -Vitamin D 21 on 6/4/2022  -On oral supplementation    #Fatigue  -TSH WNL, hemoglobin-WNL  -Stop bang score 2, low risk for ANITA  -Encouraged weight loss and dietary " modifications  -Started on iron replacement  -Follow-up with labs during next visit  -Consider sleep study if symptoms does not improve    #Overweight  -Reports unintentional weight gain  -BMI 28  -Discussed about dietary modifications and regular exercise  -Declined dietary referral at this time    Followup: Return in about 4 weeks (around 7/4/2022).    Patient discussed with attending.    Signed by: Graciela Roman M.D.    Please note that this dictation was created using voice recognition software. I have made every reasonable attempt to correct obvious errors, but I expect that there are errors of grammar and possibly content that I did not discover before finalizing the note.

## 2022-06-06 NOTE — TELEPHONE ENCOUNTER
Vitamin d low 21, pls advice patient to take OTC vitamin d supplements 4000 IU daily and will recheck level in 2 months.

## 2022-06-06 NOTE — PATIENT INSTRUCTIONS
-Continue medications as directed  -Continue lifestyle modifications as discussed - daily exercise and dietary changes  -Please get labs prior to next visit  -Follow up visit in 4 weeks with Dr. Jaquez

## 2022-06-21 ENCOUNTER — HOSPITAL ENCOUNTER (OUTPATIENT)
Dept: LAB | Facility: MEDICAL CENTER | Age: 47
End: 2022-06-21
Attending: STUDENT IN AN ORGANIZED HEALTH CARE EDUCATION/TRAINING PROGRAM
Payer: COMMERCIAL

## 2022-06-21 DIAGNOSIS — E61.1 IRON DEFICIENCY: ICD-10-CM

## 2022-06-21 DIAGNOSIS — Z13.228 SCREENING FOR METABOLIC DISORDER: ICD-10-CM

## 2022-06-21 LAB
BASOPHILS # BLD AUTO: 1 % (ref 0–1.8)
BASOPHILS # BLD: 0.06 K/UL (ref 0–0.12)
CHOLEST SERPL-MCNC: 161 MG/DL (ref 100–199)
EOSINOPHIL # BLD AUTO: 0.15 K/UL (ref 0–0.51)
EOSINOPHIL NFR BLD: 2.4 % (ref 0–6.9)
ERYTHROCYTE [DISTWIDTH] IN BLOOD BY AUTOMATED COUNT: 41.9 FL (ref 35.9–50)
EST. AVERAGE GLUCOSE BLD GHB EST-MCNC: 103 MG/DL
FASTING STATUS PATIENT QL REPORTED: NORMAL
HBA1C MFR BLD: 5.2 % (ref 4–5.6)
HCT VFR BLD AUTO: 38 % (ref 37–47)
HDLC SERPL-MCNC: 42 MG/DL
HGB BLD-MCNC: 12.7 G/DL (ref 12–16)
IMM GRANULOCYTES # BLD AUTO: 0.02 K/UL (ref 0–0.11)
IMM GRANULOCYTES NFR BLD AUTO: 0.3 % (ref 0–0.9)
LDLC SERPL CALC-MCNC: 106 MG/DL
LYMPHOCYTES # BLD AUTO: 1.73 K/UL (ref 1–4.8)
LYMPHOCYTES NFR BLD: 28 % (ref 22–41)
MCH RBC QN AUTO: 28.1 PG (ref 27–33)
MCHC RBC AUTO-ENTMCNC: 33.4 G/DL (ref 33.6–35)
MCV RBC AUTO: 84.1 FL (ref 81.4–97.8)
MONOCYTES # BLD AUTO: 0.45 K/UL (ref 0–0.85)
MONOCYTES NFR BLD AUTO: 7.3 % (ref 0–13.4)
NEUTROPHILS # BLD AUTO: 3.76 K/UL (ref 2–7.15)
NEUTROPHILS NFR BLD: 61 % (ref 44–72)
NRBC # BLD AUTO: 0 K/UL
NRBC BLD-RTO: 0 /100 WBC
PLATELET # BLD AUTO: 256 K/UL (ref 164–446)
PMV BLD AUTO: 11.2 FL (ref 9–12.9)
RBC # BLD AUTO: 4.52 M/UL (ref 4.2–5.4)
TRIGL SERPL-MCNC: 65 MG/DL (ref 0–149)
WBC # BLD AUTO: 6.2 K/UL (ref 4.8–10.8)

## 2022-06-21 PROCEDURE — 83036 HEMOGLOBIN GLYCOSYLATED A1C: CPT

## 2022-06-21 PROCEDURE — 36415 COLL VENOUS BLD VENIPUNCTURE: CPT

## 2022-06-21 PROCEDURE — 85025 COMPLETE CBC W/AUTO DIFF WBC: CPT

## 2022-06-21 PROCEDURE — 80061 LIPID PANEL: CPT

## 2022-07-08 ENCOUNTER — APPOINTMENT (OUTPATIENT)
Dept: INTERNAL MEDICINE | Facility: OTHER | Age: 47
End: 2022-07-08
Payer: COMMERCIAL

## 2022-07-13 ENCOUNTER — TELEPHONE (OUTPATIENT)
Dept: INTERNAL MEDICINE | Facility: OTHER | Age: 47
End: 2022-07-13

## 2022-07-13 ENCOUNTER — OFFICE VISIT (OUTPATIENT)
Dept: INTERNAL MEDICINE | Facility: OTHER | Age: 47
End: 2022-07-13
Payer: COMMERCIAL

## 2022-07-13 VITALS
OXYGEN SATURATION: 98 % | TEMPERATURE: 97.7 F | DIASTOLIC BLOOD PRESSURE: 75 MMHG | BODY MASS INDEX: 27.09 KG/M2 | HEART RATE: 84 BPM | HEIGHT: 65 IN | WEIGHT: 162.6 LBS | SYSTOLIC BLOOD PRESSURE: 112 MMHG

## 2022-07-13 DIAGNOSIS — M79.605 PAIN AND SWELLING OF LEFT LOWER EXTREMITY: Primary | ICD-10-CM

## 2022-07-13 DIAGNOSIS — E61.1 IRON DEFICIENCY: ICD-10-CM

## 2022-07-13 DIAGNOSIS — R53.83 OTHER FATIGUE: ICD-10-CM

## 2022-07-13 DIAGNOSIS — R22.42 LOCALIZED SWELLING OF LEFT LOWER EXTREMITY: ICD-10-CM

## 2022-07-13 DIAGNOSIS — E55.9 VITAMIN D DEFICIENCY: ICD-10-CM

## 2022-07-13 DIAGNOSIS — N92.0 MENORRHAGIA WITH REGULAR CYCLE: ICD-10-CM

## 2022-07-13 DIAGNOSIS — M79.89 PAIN AND SWELLING OF LEFT LOWER EXTREMITY: Primary | ICD-10-CM

## 2022-07-13 PROCEDURE — 99213 OFFICE O/P EST LOW 20 MIN: CPT | Mod: GE | Performed by: INTERNAL MEDICINE

## 2022-07-13 RX ORDER — FAMOTIDINE 20 MG/1
TABLET, FILM COATED ORAL
COMMUNITY
Start: 2022-06-23 | End: 2022-07-13

## 2022-07-13 NOTE — PATIENT INSTRUCTIONS
Thank you for visiting today!    Please follow-up in 5 weeks     Please follow-up on referrals and schedule an appointment with ENT     -Please start iron supplementation every other day.  -Start taking vitamin D supplementation daily as well (5050-0743 International units)  -Please get the ultrasound of your legs as well     Please try and eat healthy, get at least 30 minutes of cardiovascular exercise a day to help keep your health as best as it can be.    If you have any questions or concerns please feel free to contact us at [phone].    If you feel like you are experiencing a medical emergency please seek immediate medical attention at an urgent care or in the emergency department.      Referral information sent to the following:  Otolaryngology     [facility]  Dr. Javier Dalal  [address] [place] [address]  Phone: [phone]     I do realize the location is an inconvenience but at this time this is the only Medicaid Otolaryngology/ENT Provider. There are no providers in the [place] area. This includes [place] and the Rural area.     Please contact [facility] Transportation at [phone] to make a transportation reservation from [place] to [place] and back to [place] once you have made your appointment. [facility] provides rides FREE of charge to eligible Medicaid members.

## 2022-07-13 NOTE — PROGRESS NOTES
Lizette Baez is a 46 y.o. female who presents today with the following:    CC: Re-est, knee pain, oral swelling     HPI:  Localized swelling of left lower extremit  Pain and swelling of left lower extremityy  Ongoing for the last 4 months.  Localized swelling left lower extremity.  Does not cause pain at rest, however the pain does improve with movement and flexion.  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    Menorrhagia  Endorses heavy periods that lasts 5 to 6 days.  Describes she was previously following up with OB/GYN who recommended hysterectomy for possible adenomyosis which the patient is not willing to get done at this time.  She is not willing to follow-up with OB/GYN anymore    Screening for cervical cancer  Patient is due, will arrange on follow-up visits    Vitamin D deficiency  Did not start taking supplementation.  Describes she was unaware that she had to take supplementation.    Iron deficiency  Describes she had 2 different pills so was not sure which 1 to take due to which she never started taking it.    GERD  Denies any ongoing symptoms.  She is not currently taking any medications    Lab results  Hemoglobin 12.7 down from 13.7   MCHC low at 33.4   A1c 5.2  Lipid panel with cholesterol 161, triglycerides 67, HDL 42 and     Screening for breast cancer  Mammogram reviewed 5/23/2022 negative for gross evidence of malignancy.  Screening mammogram in 1 year        ROS:       General: No fevers, chills, night sweats, weight loss or gain  HEENT: No hearing changes, vision changes, eye pain, ear pain, nasal discharge, sore throat.  Endorses growth at the back of the throat  Neck: No swelling in neck  Pulmonary: No shortness of breath, cough, sputum, or hemoptysis  Cardiovascular: No chest pain, palpitations, or LE swelling  GI: No nausea, vomiting, diarrhea, constipation, abdominal pain, hematochezia or melena  : No dysuria or frequency  Neuro:  "No focal weakness, no general weakness, no headaches, no lightheadedness, no dizziness  Psych: No anxiety or depression    Past Medical History:   Diagnosis Date   • Anemia    • Anesthesia     post op n/v   • Arthritis     osteo/back   • Dental disorder     upper/lower dentures   • Dysmenorrhea    • Female stress incontinence        Past Surgical History:   Procedure Laterality Date   • PRIMARY C SECTION      x3   • PRIMARY C SECTION  /           Family History   Problem Relation Age of Onset   • Cancer Mother 42        breast   • Diabetes Mother    • Stroke Father    • Diabetes Maternal Uncle    • Heart Disease Neg Hx        Social History     Tobacco Use   • Smoking status: Current Every Day Smoker     Packs/day: 1.00     Types: Cigarettes   • Smokeless tobacco: Never Used   Vaping Use   • Vaping Use: Never used   Substance Use Topics   • Alcohol use: Not Currently     Comment: denies   • Drug use: Yes     Types: Marijuana, Inhaled     Comment: marijuana/ meth       Current Outpatient Medications   Medication Sig Dispense Refill   • ferrous sulfate 325 (65 Fe) MG tablet Take 1 Tablet by mouth every 48 hours. (Patient not taking: Reported on 2022) 30 Tablet 1     No current facility-administered medications for this visit.       Physical Exam:  /75 (BP Location: Left arm, Patient Position: Sitting, BP Cuff Size: Adult)   Pulse 84   Temp 36.5 °C (97.7 °F) (Temporal)   Ht 1.651 m (5' 5\")   Wt 73.8 kg (162 lb 9.6 oz)   SpO2 98%   BMI 27.06 kg/m²   General: Well developed, well nourished female, in no distress.  HEENT: NC/AT, PERRL, EOMI, no scleral icterus or conjunctival pallor, fair dentition/denture in, no nasal discharge or oral erythema or exudates.  Localized growth at the right side of the pharynx  Neck: Supple, No cervical or supraclavicular LAD   CV:RRR, no murmurs gallops or Rubs, no JVD  Pulm: LCAB, no crackles, rales, rhonchi, or wheezing  GI: Normal bowel sounds, abdomen " soft, nontender, nondistended to deep or light palpation in all 4 quadrants, no HSM.  MSK: Radial and dorsalis pedis pulses 2+ and equal bilaterally, respectively.  Strength 5 out of 5 in upper and lower extremities.  No lower extremity edema  Localized swelling at the upper calf of the left leg  Neuro: Patient is alert and oriented x3, no focal deficits  Psych: Appropriate mood and affect        Assessment and Plan:     1. Localized swelling of left lower extremity  2. Pain and swelling of left lower extremity  Less likely an acute DVT.  Endorses ongoing localized swelling for the last 4 months.  No erythema, localized tenderness to palpation evident.  Patient does endorse pain with movement, flexion of the left and squatting.   Patient is very concerned since she read about DVT and convinced that this is a DVT.  Requesting an ultrasound of the lower extremity to rule out.  -Differential diagnosis include popliteal synovial cysts/Baker's cyst  Plan:  -Ordered lower extremity DVT ultrasound for patient's peace of mind.  Also requesting ultrasound of the knee along with it to assess for   -Xray left knee     3.  Tonsillar enlargement-right-sided  Possible growth at the back of the throat-right-sided  ENT referral was placed on previous visits, which has been processed.  However the provider is in [facility] and patient never made the call.  Discussed in detail about the importance of ENT follow-up to figure out what the cause of this throat growth is.   -Counseled patient to call the ENT provider and request the initial visit to be telehealth if possible.  -Also educated her that there will be assistance for transportation.   -Information provided to the patient about the ENT provider at [facility] transportation    4. Vitamin D deficiency   Start over-the-counter supplementation daily.  We will repeat labs in 3 months    5. Iron deficiency  Counseled to start iron supplementation, every other day.     6.  Overweight with BMI  27.06  Patient works as a , runs living by cleaning houses.  Describes she gets regular workout with her job and is not willing to join a gym since she is very tired after cleaning houses.  Endorses drinking soft drinks/Coke.  Plan:  -Encouraged her on healthy diet.  Handouts provided for healthy eating at the budget as well as meal plan ideas  -Patient denied nutrition referral at this time  -Early follow-up in 5 weeks to assess response    7.  Screening for cervical cancer  Due.  We will schedule at next visit    8.  Screening for breast cancer  -Mammogram done this year, negative for malignancy  -Repeat 2023    9. Menorrhagia   Endorses heavy periods that lasts 5 to 6 days.  Describes she was previously following up with OB/GYN who recommended hysterectomy for possible adenomyosis which the patient is not willing to get done at this time.  She is not willing to follow-up with OB/GYN anymore      Follow-up in 5 weeks    Patient Instructions   Thank you for visiting today!    Please follow-up in 5 weeks     Please follow-up on referrals and schedule an appointment with ENT     -Please start iron supplementation every other day.  -Start taking vitamin D supplementation daily as well ([id number] International units)  -Please get the ultrasound of your legs as well     Please try and eat healthy, get at least 30 minutes of cardiovascular exercise a day to help keep your health as best as it can be.    If you have any questions or concerns please feel free to contact us at [phone].    If you feel like you are experiencing a medical emergency please seek immediate medical attention at an urgent care or in the emergency department.      Referral information sent to the following:  Otolaryngology     [facility]  Dr. Javier Dalal  [address] [place] [address]  Phone: [phone]     I do realize the location is an inconvenience but at this time this is the only Medicaid  Otolaryngology/ENT Provider. There are no providers in the [place] area. This includes [place] and the [place] area.     Please contact [facility] Transportation at [phone] to make a transportation reservation from [place] to [place] and back to [place] once you have made your appointment. [facility] provides rides FREE of charge to eligible Medicaid members.           Signed by: Marlen Jaquez M.D.

## 2022-07-27 ENCOUNTER — TELEPHONE (OUTPATIENT)
Dept: INTERNAL MEDICINE | Facility: OTHER | Age: 47
End: 2022-07-27
Payer: COMMERCIAL

## 2022-07-27 NOTE — TELEPHONE ENCOUNTER
Pt called and left message stating that the referral for ENT was processed, but it was sent to a doctor in [facility], pt has tried calling them to get scheduled with no success, she is asking if a new referral for ENT can be placed with specifics for a doctor in [place]. Please advise?

## 2022-07-28 NOTE — TELEPHONE ENCOUNTER
Marlen Jaquez M.D.  [facility] 13 hours ago (5:58 PM)     [place]    Patient is active on mychart. Sent her a MySiteApp msg to address her concerns     Message text

## 2022-08-03 ENCOUNTER — TELEPHONE (OUTPATIENT)
Dept: INTERNAL MEDICINE | Facility: OTHER | Age: 47
End: 2022-08-03
Payer: COMMERCIAL

## 2022-08-15 ENCOUNTER — HOSPITAL ENCOUNTER (OUTPATIENT)
Dept: RADIOLOGY | Facility: MEDICAL CENTER | Age: 47
End: 2022-08-15
Attending: INTERNAL MEDICINE
Payer: COMMERCIAL

## 2022-08-15 DIAGNOSIS — R22.42 LOCALIZED SWELLING OF LEFT LOWER EXTREMITY: ICD-10-CM

## 2022-08-15 DIAGNOSIS — M79.605 PAIN AND SWELLING OF LEFT LOWER EXTREMITY: ICD-10-CM

## 2022-08-15 DIAGNOSIS — M79.89 PAIN AND SWELLING OF LEFT LOWER EXTREMITY: ICD-10-CM

## 2022-08-15 PROCEDURE — 93971 EXTREMITY STUDY: CPT | Mod: LT

## 2022-08-17 ENCOUNTER — TELEPHONE (OUTPATIENT)
Dept: INTERNAL MEDICINE | Facility: OTHER | Age: 47
End: 2022-08-17

## 2022-08-17 ENCOUNTER — TELEMEDICINE (OUTPATIENT)
Dept: INTERNAL MEDICINE | Facility: OTHER | Age: 47
End: 2022-08-17
Payer: COMMERCIAL

## 2022-08-17 VITALS — BODY MASS INDEX: 25.71 KG/M2 | HEIGHT: 66 IN | WEIGHT: 160 LBS

## 2022-08-17 DIAGNOSIS — J39.2 THROAT MASS: ICD-10-CM

## 2022-08-17 DIAGNOSIS — J35.1 TONSILLAR ENLARGEMENT: ICD-10-CM

## 2022-08-17 DIAGNOSIS — Z00.00 HEALTHCARE MAINTENANCE: ICD-10-CM

## 2022-08-17 DIAGNOSIS — M79.89 PAIN AND SWELLING OF LEFT LOWER EXTREMITY: ICD-10-CM

## 2022-08-17 DIAGNOSIS — M79.605 PAIN AND SWELLING OF LEFT LOWER EXTREMITY: ICD-10-CM

## 2022-08-17 DIAGNOSIS — R22.42 LOCALIZED SWELLING OF LEFT LOWER EXTREMITY: ICD-10-CM

## 2022-08-17 PROCEDURE — 99213 OFFICE O/P EST LOW 20 MIN: CPT | Mod: GE,GT | Performed by: INTERNAL MEDICINE

## 2022-08-17 NOTE — TELEPHONE ENCOUNTER
Called pt per Dr. Jaquez after the telemed to schedule a 5 week follow up, pt states she will call back next week to schedule after she sees the ENT.

## 2022-08-18 ASSESSMENT — ENCOUNTER SYMPTOMS
BACK PAIN: 0
HEARTBURN: 0
NAUSEA: 0
DOUBLE VISION: 0
COUGH: 0
PALPITATIONS: 0
DIZZINESS: 0
FEVER: 0
MYALGIAS: 0
HEMOPTYSIS: 0
NECK PAIN: 0
BLURRED VISION: 0
CHILLS: 0
PHOTOPHOBIA: 0
WEIGHT LOSS: 0
HEADACHES: 0
ORTHOPNEA: 0
FALLS: 0

## 2022-08-18 NOTE — PROGRESS NOTES
Telemedicine: New Patient   This evaluation was conducted via Zoom using secure and encrypted videoconferencing technology. The patient was in their home in the state of [place].    The patient's identity was confirmed and verbal consent was obtained for this virtual visit.    Subjective:     CC:   Chief Complaint   Patient presents with    Follow-Up     Pt is going to ENT tomorrow    Results     US Results     Overdue Lab And Imaging Result Follow-up     Pt states not going to do Xray as US was good       Lizette Baez is a 46 y.o. female presenting for a follow up visit regarding her knee pain.     Left knee pain-ongoing  Left elbow pain  Per patient her knee pain started around 5 months ago, at her last visit she had a localized small swelling at the left lower extremity which should not cause any pain at rest.  Now patient endorses that she has ongoing left knee pain, denies any swelling, pain is exacerbated by kneeling down and squatting.  She had an in person visit today, however changed to telehealth last minute  Describes that she developed left elbow pain a few weeks ago, that increases with flexion and putting her elbow at the window while driving.    Patient describes that the range of motion in both her left knee as well as the left elbow is preserved, she was able to flex and extend the elbow for me to see at zoom today.         Tonsillar enlargement-right side  Right-sided throat mass ?  ENT referral was placed at the prior visits, patient has an appointment tomorrow and will follow-up with them.        Review of Systems   Constitutional:  Negative for chills, fever, malaise/fatigue and weight loss.   HENT:  Negative for hearing loss.    Eyes:  Negative for blurred vision, double vision and photophobia.   Respiratory:  Negative for cough and hemoptysis.    Cardiovascular:  Negative for chest pain, palpitations and orthopnea.   Gastrointestinal:  Negative for heartburn and nausea.   Genitourinary:   "Negative for dysuria and urgency.   Musculoskeletal:  Positive for joint pain. Negative for back pain, falls, myalgias and neck pain.   Skin:  Negative for itching and rash.   Neurological:  Negative for dizziness and headaches.       Allergies   Allergen Reactions    Pcn [Penicillins] Vomiting and Nausea       Current medicines (including changes today)  Current Outpatient Medications   Medication Sig Dispense Refill    ferrous sulfate 325 (65 Fe) MG tablet Take 1 Tablet by mouth every 48 hours. 30 Tablet 1     No current facility-administered medications for this visit.       She  has a past medical history of Anemia, Anesthesia, Arthritis, Dental disorder, Dysmenorrhea, and Female stress incontinence.  She  has a past surgical history that includes primary c section and primary c section (2005/2012).      Family History   Problem Relation Age of Onset    Cancer Mother 42        breast    Diabetes Mother     Stroke Father     Diabetes Maternal Uncle     Heart Disease Neg Hx      Family Status   Relation Name Status    Mo  (Not Specified)    Fa  (Not Specified)    MUnc  (Not Specified)    Neg Hx  (Not Specified)       Patient Active Problem List    Diagnosis Date Noted    Vitamin D deficiency 06/06/2022    BMI 28.0-28.9,adult 06/06/2022    Screening for metabolic disorder 06/06/2022    Iron deficiency 06/06/2022    Other fatigue 06/06/2022    Chronic low back pain without sciatica 09/21/2018    Right hip pain 09/21/2018    Breast nodule 09/21/2018          Objective:   Ht 1.676 m (5' 6\")   Wt 72.6 kg (160 lb)   BMI 25.82 kg/m²     Physical Exam  Constitutional:       General: She is not in acute distress.  Neurological:      Mental Status: She is alert.   Head is normocephalic and atraumatic, no visual skin lesions appreciated, no jaundice/yellowness of sclera appreciated, patient moving all 4 extremities spontaneously and breathing without any apparent discomfort      Assessment and Plan:   The following " treatment plan was discussed:     Left knee pain-ongoing  Left elbow pain  Per patient her knee pain started around 5 months ago, at her last visit she had a localized small swelling at the left lower extremity which should not cause any pain at rest.  Now patient endorses that she has ongoing left knee pain, denies any swelling, pain is exacerbated by kneeling down and squatting.  She had an in person visit today, however changed to telehealth last minute  Describes that she developed left elbow pain a few weeks ago, that increases with flexion and putting her elbow at the window while driving.    Patient describes that the range of motion in both her left knee as well as the left elbow is preserved, she was able to flex and extend the elbow for me to see at zoom today.     Denied any fevers or ongoing systemic symptoms    Plan:  -Ultrasound reviewed with patient which was negative for DVTs.  -History sounds consistent with likely lateral epicondylitis and prepatellar bursitis overuse injuries, however to confirm the diagnosis and in person detailed physical examination is required  -Patient was educated at this visit on the importance of in person evaluations, advised to follow-up in person after ENT appointment to comprehensively assess and manage her condition  -Recommended activity modifications/avoidance of repetitive motions causing pain in the interim as this would be the main treatment for both      Tonsillar enlargement-right side  Right-sided throat mass ?  ENT referral was placed at the prior visits, patient has an appointment tomorrow and will follow-up with them.      Vaccines   Healthcare maintenance   -Recommended COVID vaccines along with Tdap        Follow-up: Follow-up in 2 weeks

## 2022-08-26 ENCOUNTER — PATIENT MESSAGE (OUTPATIENT)
Dept: INTERNAL MEDICINE | Facility: OTHER | Age: 47
End: 2022-08-26
Payer: COMMERCIAL

## 2022-08-26 DIAGNOSIS — M79.605 PAIN AND SWELLING OF LEFT LOWER EXTREMITY: ICD-10-CM

## 2022-08-26 DIAGNOSIS — M25.522 LEFT ELBOW PAIN: ICD-10-CM

## 2022-08-26 DIAGNOSIS — M79.89 PAIN AND SWELLING OF LEFT LOWER EXTREMITY: ICD-10-CM

## 2022-08-26 DIAGNOSIS — M19.90 ARTHRITIS: ICD-10-CM

## 2022-08-26 DIAGNOSIS — R22.42 LOCALIZED SWELLING OF LEFT LOWER EXTREMITY: ICD-10-CM

## 2022-08-26 NOTE — PATIENT COMMUNICATION
Can you please update the order and let me know when it is done so I can send to [facility] for the patient?

## 2022-08-26 NOTE — PROGRESS NOTES
Also called patient- number not in service.     Called 's number- discussed concerns. He was driving and will have patient call us back once he gets home

## 2022-08-30 NOTE — PATIENT COMMUNICATION
Spoke with pt letting her know I have faxed over the order again with demographics sheet and insurance cards, I have highlighted the diagnosis codes and electronic signature so they can see that. She understood and I let her know that if there are any other issues to give me a call

## 2022-11-02 ENCOUNTER — RESEARCH ENCOUNTER (OUTPATIENT)
Dept: RESEARCH | Facility: WORKSITE | Age: 47
End: 2022-11-02
Payer: COMMERCIAL

## 2022-11-09 ENCOUNTER — APPOINTMENT (OUTPATIENT)
Dept: URGENT CARE | Facility: PHYSICIAN GROUP | Age: 47
End: 2022-11-09
Payer: COMMERCIAL

## 2022-12-19 ENCOUNTER — OFFICE VISIT (OUTPATIENT)
Dept: INTERNAL MEDICINE | Facility: OTHER | Age: 47
End: 2022-12-19
Payer: COMMERCIAL

## 2022-12-19 VITALS
HEART RATE: 88 BPM | HEIGHT: 66 IN | DIASTOLIC BLOOD PRESSURE: 66 MMHG | OXYGEN SATURATION: 100 % | BODY MASS INDEX: 26.84 KG/M2 | TEMPERATURE: 97.3 F | SYSTOLIC BLOOD PRESSURE: 101 MMHG | WEIGHT: 167 LBS

## 2022-12-19 DIAGNOSIS — M54.50 CHRONIC BILATERAL LOW BACK PAIN WITHOUT SCIATICA: ICD-10-CM

## 2022-12-19 DIAGNOSIS — M47.895 OTHER OSTEOARTHRITIS OF SPINE, THORACOLUMBAR REGION: ICD-10-CM

## 2022-12-19 DIAGNOSIS — S33.5XXA SPRAIN OF LOW BACK, INITIAL ENCOUNTER: ICD-10-CM

## 2022-12-19 DIAGNOSIS — G89.29 CHRONIC BILATERAL LOW BACK PAIN WITHOUT SCIATICA: ICD-10-CM

## 2022-12-19 PROCEDURE — 99214 OFFICE O/P EST MOD 30 MIN: CPT | Mod: GC | Performed by: INTERNAL MEDICINE

## 2022-12-19 RX ORDER — CYCLOBENZAPRINE HCL 10 MG
10 TABLET ORAL 2 TIMES DAILY PRN
Qty: 10 TABLET | Refills: 0 | Status: SHIPPED | OUTPATIENT
Start: 2022-12-19

## 2022-12-19 ASSESSMENT — ENCOUNTER SYMPTOMS
MYALGIAS: 1
HEARTBURN: 0
DOUBLE VISION: 0
COUGH: 0
BLURRED VISION: 0
ORTHOPNEA: 0
BACK PAIN: 1
NAUSEA: 0
CHILLS: 0
PALPITATIONS: 0
FEVER: 0
NERVOUS/ANXIOUS: 0
DIZZINESS: 0
HEMOPTYSIS: 0

## 2022-12-19 NOTE — PATIENT INSTRUCTIONS
Thank you for visiting today!  Please follow-up in 5 weeks   Please get xrays done at least 5 days before next visit.  Please use topical voltarel gel at the back (up to 4 times daily). Please use icy hot spray at the back. You can also try lidocaine patch for the back   Please try and eat healthy, get at least 30 minutes of cardiovascular exercise a day to help keep your health as best as it can be.  If you have any questions or concerns please feel free to contact us at [phone].  If you feel like you are experiencing a medical emergency please seek immediate medical attention at an urgent care or in the emergency department.'

## 2022-12-19 NOTE — PROGRESS NOTES
"CC: Follow up visit, back pain, PAP smear     HPI:   Lizette presents today with past medical history of tonsillar enlargement-right-sided with concerns of a mass, she followed up with ENT and was reassured about the benign nature of the growth as it is mobile and not firm without any alarm signs.    She previously had pain in her left knee and left elbow s/p x-rays.  Today's visit was initially scheduled for a Pap smear, however she decided to get it done at a future visit due to the acuity of her concerns.  Describes that she has had mid and lower back pain for a few months now, that comes in spasms, is bilateral.  Describes she fell from her stairs about a month-6 weeks ago and since then she has had increased pain at her lower back.  She had an x-ray done when she was 20 years old and was described that she had bad osteoarthritis and possible scoliosis, is very concerned if that is causing her symptoms.    No problems updated.    Health Maintenance: Completed    ROS:  Review of Systems   Constitutional:  Negative for chills and fever.   HENT:  Negative for hearing loss.    Eyes:  Negative for blurred vision and double vision.   Respiratory:  Negative for cough and hemoptysis.    Cardiovascular:  Negative for palpitations and orthopnea.   Gastrointestinal:  Negative for heartburn and nausea.   Genitourinary:  Negative for dysuria, frequency, hematuria and urgency.   Musculoskeletal:  Positive for back pain, joint pain and myalgias.   Skin:  Negative for rash.   Neurological:  Negative for dizziness.   Psychiatric/Behavioral:  The patient is not nervous/anxious.      Objective:     Exam:  /66 (BP Location: Left arm, Patient Position: Sitting, BP Cuff Size: Adult)   Pulse 88   Temp 36.3 °C (97.3 °F) (Temporal)   Ht 1.664 m (5' 5.5\")   Wt 75.8 kg (167 lb)   SpO2 100%   BMI 27.37 kg/m²  Body mass index is 27.37 kg/m².    Physical Exam  Constitutional:       General: She is not in acute distress.     " Appearance: She is not ill-appearing.   HENT:      Head: Normocephalic and atraumatic.      Right Ear: External ear normal.      Left Ear: External ear normal.      Nose: Nose normal.      Mouth/Throat:      Mouth: Mucous membranes are moist.   Eyes:      Extraocular Movements: Extraocular movements intact.      Pupils: Pupils are equal, round, and reactive to light.   Cardiovascular:      Rate and Rhythm: Normal rate and regular rhythm.   Pulmonary:      Effort: Pulmonary effort is normal. No respiratory distress.      Breath sounds: Normal breath sounds.   Musculoskeletal:         General: No swelling or deformity. Normal range of motion.      Right lower leg: No edema.      Left lower leg: No edema.      Comments: Back     Without any apparent deformity  Tense lower back muscles   Intact range of motion   Intact sensation   Discomfort with flexion    Skin:     General: Skin is warm.      Capillary Refill: Capillary refill takes less than 2 seconds.   Neurological:      Mental Status: She is alert and oriented to person, place, and time.       A chaperone was offered to the patient during today's exam. Chaperone name: Dr. Hoang  was present.    Labs: Reviewed     Assessment & Plan:     47 y.o. female with the following -       Musculoskeletal pain  Chronic bilateral lower back pain without sciatica  No limitation of movement, no sensory changes or alarm symptoms.  No bladder/bowel dysfunction, loss of sensation in the lower extremities.  -Topical diclofenac gel up to 4 times daily  -IcyHot sprays  -Lidocaine patch  -X-rays have been ordered  -As needed Flexeril for spasms  -We will review x-rays and order physical therapy if negative for any acute issues  -Early follow-up in 5 weeks to assess response    -She does describe a prior history of gallstones, however with the bilateral back pain, spasmodic pain, cholecystitis seems less likely.  We will get x-rays and then go from there.  -Less likely herpes, no rash,  nature of pain is not burning  -Less likely renal stones.  No urinary complaints per patient      I spent a total of 30 minutes with record review, exam, communication with the patient, communication with other providers, and documentation of this encounter.      Return in about 5 weeks (around 1/23/2023).    Please note that this dictation was created using voice recognition software. I have made every reasonable attempt to correct obvious errors, but I expect that there are errors of grammar and possibly content that I did not discover before finalizing the note.

## 2022-12-20 ENCOUNTER — HOSPITAL ENCOUNTER (OUTPATIENT)
Dept: RADIOLOGY | Facility: MEDICAL CENTER | Age: 47
End: 2022-12-20
Attending: INTERNAL MEDICINE
Payer: COMMERCIAL

## 2022-12-20 DIAGNOSIS — M47.895 OTHER OSTEOARTHRITIS OF SPINE, THORACOLUMBAR REGION: ICD-10-CM

## 2022-12-20 PROCEDURE — 72100 X-RAY EXAM L-S SPINE 2/3 VWS: CPT

## 2022-12-20 PROCEDURE — 72070 X-RAY EXAM THORAC SPINE 2VWS: CPT

## 2022-12-23 DIAGNOSIS — E66.3 OVERWEIGHT: ICD-10-CM

## 2022-12-27 DIAGNOSIS — E61.1 IRON DEFICIENCY: ICD-10-CM

## 2022-12-27 RX ORDER — FERROUS SULFATE 325(65) MG
325 TABLET ORAL
Qty: 30 TABLET | Refills: 1 | Status: SHIPPED | OUTPATIENT
Start: 2022-12-27 | End: 2023-04-24

## 2022-12-27 NOTE — TELEPHONE ENCOUNTER
Ferrous Sulfate     Received request via: Pharmacy    Was the patient seen in the last year in this department? Yes    Does the patient have an active prescription (recently filled or refills available) for medication(s) requested? No    Does the patient have [facility] and need 100 day supply (blood pressure, diabetes and cholesterol meds only)? Patient does not have SCP

## 2023-02-06 ENCOUNTER — OFFICE VISIT (OUTPATIENT)
Dept: INTERNAL MEDICINE | Facility: OTHER | Age: 48
End: 2023-02-06
Payer: MEDICAID

## 2023-02-06 VITALS
TEMPERATURE: 99 F | OXYGEN SATURATION: 99 % | SYSTOLIC BLOOD PRESSURE: 107 MMHG | WEIGHT: 168.6 LBS | HEART RATE: 90 BPM | BODY MASS INDEX: 28.09 KG/M2 | HEIGHT: 65 IN | DIASTOLIC BLOOD PRESSURE: 70 MMHG

## 2023-02-06 DIAGNOSIS — M54.50 CHRONIC BILATERAL LOW BACK PAIN WITHOUT SCIATICA: ICD-10-CM

## 2023-02-06 DIAGNOSIS — G89.29 CHRONIC BILATERAL LOW BACK PAIN WITHOUT SCIATICA: ICD-10-CM

## 2023-02-06 DIAGNOSIS — M47.895 OTHER OSTEOARTHRITIS OF SPINE, THORACOLUMBAR REGION: ICD-10-CM

## 2023-02-06 DIAGNOSIS — M19.90 ARTHRITIS: ICD-10-CM

## 2023-02-06 DIAGNOSIS — Z12.11 SCREENING FOR COLORECTAL CANCER: ICD-10-CM

## 2023-02-06 DIAGNOSIS — Z12.12 SCREENING FOR COLORECTAL CANCER: ICD-10-CM

## 2023-02-06 DIAGNOSIS — Z12.4 SCREENING FOR CERVICAL CANCER: ICD-10-CM

## 2023-02-06 PROCEDURE — 99214 OFFICE O/P EST MOD 30 MIN: CPT | Mod: GC | Performed by: INTERNAL MEDICINE

## 2023-02-06 RX ORDER — METRONIDAZOLE 500 MG/1
TABLET ORAL
COMMUNITY
Start: 2023-01-26

## 2023-02-06 ASSESSMENT — PATIENT HEALTH QUESTIONNAIRE - PHQ9: CLINICAL INTERPRETATION OF PHQ2 SCORE: 0

## 2023-02-06 ASSESSMENT — ENCOUNTER SYMPTOMS
HEADACHES: 0
EYE PAIN: 0
BACK PAIN: 1
MYALGIAS: 0
NERVOUS/ANXIOUS: 0
CHILLS: 0
FEVER: 0
WEIGHT LOSS: 0
PALPITATIONS: 0
DIARRHEA: 0
HEMOPTYSIS: 0
COUGH: 0

## 2023-02-06 NOTE — PATIENT INSTRUCTIONS
Thank you for visiting today!  Please follow-up in 5 weeks   Please follow-up on referrals and schedule an appointment with nutrition specialist   Referral information sent to the following:  Nutritional Counseling     [facility]  [address]  [address] [facility] [place]  [address]  Phone:  [phone]  Fax:  [phone]        Please follow up with physical therapy   Please try and eat healthy, get at least 30 minutes of cardiovascular exercise a day to help keep your health as best as it can be.  If you have any questions or concerns please feel free to contact us at [phone].  If you feel like you are experiencing a medical emergency please seek immediate medical attention at an urgent care or in the emergency department.

## 2023-02-06 NOTE — PROGRESS NOTES
"CC: PAP smear, overweight, back pain     HPI:     ## PAP smear   Lizette presents today for a PAP smear. She notes her menstrual cycles are regular and about every 3 weeks. Denies heavy cramping or heavy blood flow. Is currently sexually active. PMHx significant for an abnormal PAP, describes she had a colposcopy done at the time as well. This was while she was pregnant.   Denies any intermenstrual, postcoital bleeding, no ext vaginal lesions per patient.     ## Weight gain   Endorses it has gotten easy for her to gain weight with age, Is requesting if she can get a prescription for wegovy.     ## Back pain   Chronic, xrays were ordered at the last visit. Reviewed them with patient today     ## Encounter or screening of colorectal cancer   She is due for her colonoscopy       No problems updated.    Health Maintenance: Completed    ROS:  Review of Systems   Constitutional:  Positive for malaise/fatigue. Negative for chills, fever and weight loss.   HENT:  Negative for hearing loss and tinnitus.    Eyes:  Negative for pain.   Respiratory:  Negative for cough and hemoptysis.    Cardiovascular:  Negative for chest pain and palpitations.   Gastrointestinal:  Negative for diarrhea.   Genitourinary:  Negative for frequency and urgency.   Musculoskeletal:  Positive for back pain. Negative for myalgias.   Skin:  Negative for itching and rash.   Neurological:  Negative for headaches.   Psychiatric/Behavioral:  The patient is not nervous/anxious.      Objective:     Exam:  /70 (BP Location: Left arm, Patient Position: Sitting, BP Cuff Size: Adult)   Pulse 90   Temp 37.2 °C (99 °F) (Temporal)   Ht 1.651 m (5' 5\")   Wt 76.5 kg (168 lb 9.6 oz)   SpO2 99%   BMI 28.06 kg/m²  Body mass index is 28.06 kg/m².    Physical Exam  Constitutional:       General: She is not in acute distress.     Appearance: She is not ill-appearing.   HENT:      Head: Normocephalic and atraumatic.      Right Ear: External ear normal.      Left " Ear: External ear normal.      Nose: Nose normal. No congestion.      Mouth/Throat:      Mouth: Mucous membranes are moist.   Eyes:      Extraocular Movements: Extraocular movements intact.      Pupils: Pupils are equal, round, and reactive to light.   Cardiovascular:      Rate and Rhythm: Normal rate and regular rhythm.      Pulses: Normal pulses.   Genitourinary:     General: Normal vulva.      Vagina: No vaginal discharge.   Skin:     General: Skin is warm.      Capillary Refill: Capillary refill takes less than 2 seconds.   Neurological:      Mental Status: She is alert and oriented to person, place, and time.       A chaperone was offered to the patient during today's exam. Chaperone name: Dr. Fried was present.    Labs: Reviewed     Assessment & Plan:     47 y.o. female with the following -       ## PAP smear   Lizette presents today for a PAP smear. She notes her menstrual cycles are regular and about every 3 weeks. Denies heavy cramping or heavy blood flow. Is currently sexually active. PMHx significant for an abnormal PAP, describes she had a colposcopy done at the time as well. This was while she was pregnant.   Denies any intermenstrual, postcoital bleeding, no ext vaginal lesions per patient.   - PAP smear performed at clinic today with chaperon at the bedside (Dr. Fried)  - Consent was obtained , patient placed in supine, dorsal lithotomy position   - No external vaginal lesions appreciated, No erythema on examination.   - Frial endocervical mucosa that bleed on touching with the brush   - Endocervical sample collected using an endocervical brush, brush rotated through 180 degrees. Sample rolled and fixed for sending to lab       ## Weight gain   Endorses it has gotten easy for her to gain weight with age, Is requesting if she can get a prescription for wegovy.   - I advise reducing sugars/carbohydrates/saturated fats/alcohol, and eating more vegetables and lean meats such as fish/chicken/turkey. I  also recommend 30 minutes of cardiovascular exercise most days of the week.  - Nutrition referral       ## Back pain   Chronic, xrays were ordered at the last visit. Reviewed them with patient today   - Referral to physical therapy   - PRN topical diclofenac gel   - Early f/u in 5 weeks to assess response       ## Encounter or screening of colorectal cancer   - Referral to GI for colonoscopy placed           I spent a total of 30 minutes with record review, exam, communication with the patient, communication with other providers, and documentation of this encounter.      Return in about 5 weeks (around 3/13/2023).    Please note that this dictation was created using voice recognition software. I have made every reasonable attempt to correct obvious errors, but I expect that there are errors of grammar and possibly content that I did not discover before finalizing the note.

## 2023-02-08 DIAGNOSIS — M47.895 OTHER OSTEOARTHRITIS OF SPINE, THORACOLUMBAR REGION: ICD-10-CM

## 2023-02-08 DIAGNOSIS — S33.5XXA SPRAIN OF LOW BACK, INITIAL ENCOUNTER: ICD-10-CM

## 2023-02-08 RX ORDER — CYCLOBENZAPRINE HCL 10 MG
10 TABLET ORAL 2 TIMES DAILY PRN
Qty: 10 TABLET | Refills: 0 | OUTPATIENT
Start: 2023-02-08

## 2023-02-08 NOTE — TELEPHONE ENCOUNTER
Cyclobenzaprine Refill     Received request via: Pharmacy    Was the patient seen in the last year in this department? Yes    Does the patient have an active prescription (recently filled or refills available) for medication(s) requested? No    Does the patient have longterm Plus and need 100 day supply (blood pressure, diabetes and cholesterol meds only)? Patient does not have SCP

## 2023-02-10 DIAGNOSIS — Z12.4 SCREENING FOR CERVICAL CANCER: ICD-10-CM

## 2023-04-05 ENCOUNTER — PHYSICAL THERAPY (OUTPATIENT)
Dept: PHYSICAL THERAPY | Facility: REHABILITATION | Age: 48
End: 2023-04-05
Attending: INTERNAL MEDICINE
Payer: MEDICAID

## 2023-04-05 DIAGNOSIS — M19.90 ARTHRITIS: ICD-10-CM

## 2023-04-05 DIAGNOSIS — M54.50 CHRONIC BILATERAL LOW BACK PAIN WITHOUT SCIATICA: ICD-10-CM

## 2023-04-05 DIAGNOSIS — G89.29 CHRONIC BILATERAL LOW BACK PAIN WITHOUT SCIATICA: ICD-10-CM

## 2023-04-05 DIAGNOSIS — M47.895 OTHER OSTEOARTHRITIS OF SPINE, THORACOLUMBAR REGION: ICD-10-CM

## 2023-04-05 PROCEDURE — 97162 PT EVAL MOD COMPLEX 30 MIN: CPT

## 2023-04-05 PROCEDURE — 999999 HB NO CHARGE

## 2023-04-05 NOTE — OP THERAPY EVALUATION
She states that she has had back pain her whole life. She states she has scoliosis and bulging discs. She recently fell down the stairs and she wasn't sure if she broke her ribs. She had an x-ray and they found a hairline fracture. She doesn't know where. She was advised to have a decompression surgery. She also states her hormones have been off and is going to start hormone replacement therapy. She states that she didn't like PT in the past because there was a lot of pn, that was in her 20's.     Pt states that she is just frustrated with this process and doesn't really feel that PT will help her or change what the issue is causing her pain. Pt and PT discussed that if she does not feel ready to start PT that it is always an option for her in the future when she is ready. Educated her on expectations and prognosis of PT for back pain and advised pt to trial conservative options when ready instead of surgery first. Pt agreed this is a good plan and will return for an eval when ready.Pt's case will be closed and discharged at this time and this will be a no charge visit.

## 2023-04-05 NOTE — OP THERAPY EVALUATION
Outpatient Physical Therapy  INITIAL EVALUATION    [facility] [address]  [address]  [place] [address]  Phone:  [phone]  Fax:  [phone]    Date of Evaluation: 2023    Patient: Lizette Baez  YOB: 1975  MRN: [id number]     Referring Provider: Marlen Jaquez M.D.  [address] [place] [address]   Referring Diagnosis Other osteoarthritis of spine, thoracolumbar region [M47.895];Arthritis [M19.90];Chronic bilateral low back pain without sciatica [M54.50, G89.29]     Time Calculation                 Chief Complaint: No chief complaint on file.    Visit Diagnoses     ICD-10-CM   1. Other osteoarthritis of spine, thoracolumbar region  M47.895   2. Arthritis  M19.90   3. Chronic bilateral low back pain without sciatica  M54.50    G89.29       Date of onset of impairment: No data found    Subjective    Past Medical History:   Diagnosis Date    Anemia     Anesthesia     post op n/v    Arthritis     osteo/back    Dental disorder     upper/lower dentures    Dysmenorrhea     Female stress incontinence      Past Surgical History:   Procedure Laterality Date    PRIMARY C SECTION      x3    PRIMARY C SECTION  /         Social History     Tobacco Use    Smoking status: Every Day     Packs/day: 1.00     Types: Cigarettes    Smokeless tobacco: Never   Substance Use Topics    Alcohol use: Not Currently     Comment: denies     Family and Occupational History     Socioeconomic History    Marital status:      Spouse name: Not on file    Number of children: Not on file    Years of education: Not on file    Highest education level: Some college, no degree   Occupational History    Not on file       Objective     General Comments     Spine Comments   L/s AROM:  Flex:   Ext:   LLF:   RLF:    Repeated motions:   Flex:   Ext:     T/s AROM:  Flex:  Ext:   LR:  RR:    Passive SLR:     Dermatomes:  Myotomes:   DTR:     Hip MMT:   Flex:   Ext:    Abd:  IR:  ER:    Hip PROM:  Flex:   Ext:   Abd:  IR:  ER:    Trans abs engagement:     PA's      Exercises/Treatment  Time-based treatments/modalities:           Assessment, Response and Plan:   Assessment details:  Ms. Baez is a 46 y/o female who presents in PT w/ s/s consistent w/ possible ***. She presents w/ deficits in *** that are preventing her from ***. Pt will cont to benefit from PT at this time in order to address her functional limitations and help her reach her functional goals.         Functional Assessment Used        Referring provider co-signature:  I have reviewed this plan of care and my co-signature certifies the need for services.    Certification Period: 04/05/2023 to  {Future Date:88770}    Physician Signature: ________________________________ Date: ______________

## 2023-04-27 ENCOUNTER — PATIENT MESSAGE (OUTPATIENT)
Dept: INTERNAL MEDICINE | Facility: OTHER | Age: 48
End: 2023-04-27
Payer: MEDICAID

## 2023-04-27 DIAGNOSIS — E61.1 IRON DEFICIENCY: ICD-10-CM

## 2023-04-27 RX ORDER — FERROUS SULFATE 325(65) MG
325 TABLET ORAL DAILY
Qty: 90 TABLET | Refills: 1 | Status: SHIPPED | OUTPATIENT
Start: 2023-04-27

## 2024-05-08 ENCOUNTER — OFFICE VISIT (OUTPATIENT)
Dept: INTERNAL MEDICINE | Facility: OTHER | Age: 49
End: 2024-05-08
Payer: MEDICAID

## 2024-05-08 ENCOUNTER — APPOINTMENT (OUTPATIENT)
Dept: INTERNAL MEDICINE | Facility: OTHER | Age: 49
End: 2024-05-08
Payer: MEDICAID

## 2024-05-08 VITALS
HEART RATE: 75 BPM | SYSTOLIC BLOOD PRESSURE: 102 MMHG | OXYGEN SATURATION: 97 % | HEIGHT: 65 IN | TEMPERATURE: 67.4 F | BODY MASS INDEX: 26.39 KG/M2 | DIASTOLIC BLOOD PRESSURE: 64 MMHG | WEIGHT: 158.4 LBS

## 2024-05-08 DIAGNOSIS — L57.0 ACTINIC KERATOSIS: ICD-10-CM

## 2024-05-08 PROCEDURE — 17000 DESTRUCT PREMALG LESION: CPT | Mod: GC | Performed by: INTERNAL MEDICINE

## 2024-05-08 ASSESSMENT — ENCOUNTER SYMPTOMS
NAUSEA: 0
HEARTBURN: 0
FEVER: 0
VOMITING: 0
MYALGIAS: 0
DIZZINESS: 0
BLURRED VISION: 0
HEMOPTYSIS: 0
COUGH: 0
DOUBLE VISION: 0
NECK PAIN: 0
PALPITATIONS: 0
ROS SKIN COMMENTS: SKIN LESIONS
HEADACHES: 0
CHILLS: 0
NERVOUS/ANXIOUS: 0

## 2024-05-08 ASSESSMENT — PATIENT HEALTH QUESTIONNAIRE - PHQ9: CLINICAL INTERPRETATION OF PHQ2 SCORE: 0

## 2024-05-08 ASSESSMENT — PAIN SCALES - GENERAL: PAINLEVEL: NO PAIN

## 2024-05-08 NOTE — PATIENT INSTRUCTIONS
Thank you for visiting today!    Please follow-up in 3 months     Please try and eat healthy, get at least 30 minutes of cardiovascular exercise a day to help keep your health as best as it can be.    If you have any questions or concerns please feel free to contact us at [phone].    If you feel like you are experiencing a medical emergency please seek immediate medical attention at an urgent care or in the emergency department.

## 2024-05-08 NOTE — PROGRESS NOTES
"CC:   Chief Complaint   Patient presents with    Skin Lesion     Patient here for facial lesion times 2 years         HPI:   Lizette presents today with chief complain of:    Skin lesion:  - At the lateral side of the left eye.  Chronic.  Describes it has been there for the last 2 years at least.  It is erythematous and occasionally scabs which she picks on.   - Mole at the bellybutton and a mole at the medial side of right eye.  Both symmetric with smooth margins, uniform color, less than 1-2 mm in diameter.  Describes they have not recently grown.      No problems updated.    Health Maintenance: Completed    ROS:  Review of Systems   Constitutional:  Negative for chills, fever and malaise/fatigue.   HENT:  Negative for hearing loss and tinnitus.    Eyes:  Negative for blurred vision and double vision.   Respiratory:  Negative for cough and hemoptysis.    Cardiovascular:  Negative for chest pain and palpitations.   Gastrointestinal:  Negative for heartburn, nausea and vomiting.   Genitourinary:  Negative for dysuria, frequency and urgency.   Musculoskeletal:  Negative for myalgias and neck pain.   Skin:  Negative for itching and rash.        Skin lesions    Neurological:  Negative for dizziness and headaches.   Psychiatric/Behavioral:  The patient is not nervous/anxious.        Objective:     Exam:  /64 (BP Location: Left arm, Patient Position: Sitting, BP Cuff Size: Large adult)   Pulse 75   Temp (!) 19.7 °C (67.4 °F) (Temporal)   Ht 1.651 m (5' 5\")   Wt 71.8 kg (158 lb 6.4 oz)   LMP 04/16/2024   SpO2 97%   BMI 26.36 kg/m²  Body mass index is 26.36 kg/m².    Physical Exam  Constitutional:       General: She is not in acute distress.     Appearance: Normal appearance. She is not ill-appearing.   HENT:      Head: Normocephalic and atraumatic.      Right Ear: External ear normal.      Left Ear: External ear normal.      Nose: Nose normal. No congestion.      Mouth/Throat:      Mouth: Mucous membranes are " moist.   Eyes:      Extraocular Movements: Extraocular movements intact.      Pupils: Pupils are equal, round, and reactive to light.   Pulmonary:      Effort: Pulmonary effort is normal. No respiratory distress.   Abdominal:      Comments: Small 1x1 mm mole in the belly button   Symmetric / smooth / regular margins   No color variation    Musculoskeletal:         General: Normal range of motion.      Cervical back: Normal range of motion.   Skin:     General: Skin is warm.      Comments: Small reddish lesion of actinic keratosis at the lateral side of left eye   Small mole near medial side of right eye, symmetric margins, no change of color/variation in color      Neurological:      Mental Status: She is alert.             Assessment & Plan:     48 y.o. female with the following -     Skin lesion:  - Actinic keratosis at the lateral side of the left eye.  Chronic.  Describes it has been there for the last 2 years at least.  It is erythematous and occasionally scabs which she picks on.   - Mole at the bellybutton and a mole at the medial side of right eye, both have been there for the last 10-20 years per patient.  Both symmetric with smooth margins, uniform color, less than 1-2 mm in diameter.  Describes they have not recently grown.  Examined skin in detail with Dr. Watkins at bedside, reassured patient that her lesions look benign.    Actinic keratosis at the lateral side of left eye.  We discussed freezing with liquid nitrogen, procedure was discussed in detail along with common side effects, consent obtained and the lesion was frozen with liquid nitrogen.     Advised her on regular use of sunscreen      # Encounter for screening of cervical cancer   - PAP smear 2/23 negative for intraepithelial lesion or malignancy. Negative for high risk HPV     # Encounter for screening for colorectal cancer   - [facility]oscopy, which she describes was normal, next  colonoscopy in 10 years   - Release of records paperwork provided to patient today so we may get official records       I spent a total of 30 minutes with record review, exam, communication with the patient, communication with other providers, and documentation of this encounter.      Return in about 3 months (around 8/8/2024).    Please note that this dictation was created using voice recognition software. I have made every reasonable attempt to correct obvious errors, but I expect that there are errors of grammar and possibly content that I did not discover before finalizing the note.

## 2024-06-18 ENCOUNTER — APPOINTMENT (OUTPATIENT)
Dept: INTERNAL MEDICINE | Facility: OTHER | Age: 49
End: 2024-06-18

## 2024-06-20 ENCOUNTER — HOSPITAL ENCOUNTER (OUTPATIENT)
Facility: MEDICAL CENTER | Age: 49
End: 2024-06-20
Attending: NURSE PRACTITIONER
Payer: MEDICAID

## 2024-06-20 ENCOUNTER — APPOINTMENT (OUTPATIENT)
Dept: URGENT CARE | Facility: PHYSICIAN GROUP | Age: 49
End: 2024-06-20

## 2024-06-20 ENCOUNTER — OFFICE VISIT (OUTPATIENT)
Dept: URGENT CARE | Facility: PHYSICIAN GROUP | Age: 49
End: 2024-06-20
Payer: MEDICAID

## 2024-06-20 VITALS
WEIGHT: 150 LBS | HEIGHT: 65 IN | DIASTOLIC BLOOD PRESSURE: 70 MMHG | HEART RATE: 88 BPM | SYSTOLIC BLOOD PRESSURE: 110 MMHG | RESPIRATION RATE: 16 BRPM | OXYGEN SATURATION: 99 % | BODY MASS INDEX: 24.99 KG/M2 | TEMPERATURE: 97.5 F

## 2024-06-20 DIAGNOSIS — N30.00 ACUTE CYSTITIS WITHOUT HEMATURIA: ICD-10-CM

## 2024-06-20 DIAGNOSIS — R35.0 URINARY FREQUENCY: ICD-10-CM

## 2024-06-20 LAB
APPEARANCE UR: NORMAL
BILIRUB UR STRIP-MCNC: NEGATIVE MG/DL
COLOR UR AUTO: NORMAL
GLUCOSE UR STRIP.AUTO-MCNC: NEGATIVE MG/DL
KETONES UR STRIP.AUTO-MCNC: NEGATIVE MG/DL
LEUKOCYTE ESTERASE UR QL STRIP.AUTO: NORMAL
NITRITE UR QL STRIP.AUTO: NEGATIVE
PH UR STRIP.AUTO: 5.5 [PH] (ref 5–8)
PROT UR QL STRIP: NEGATIVE MG/DL
RBC UR QL AUTO: NORMAL
SP GR UR STRIP.AUTO: >=1.03
UROBILINOGEN UR STRIP-MCNC: NORMAL MG/DL

## 2024-06-20 PROCEDURE — 3078F DIAST BP <80 MM HG: CPT | Performed by: NURSE PRACTITIONER

## 2024-06-20 PROCEDURE — 3074F SYST BP LT 130 MM HG: CPT | Performed by: NURSE PRACTITIONER

## 2024-06-20 PROCEDURE — 99203 OFFICE O/P NEW LOW 30 MIN: CPT | Performed by: NURSE PRACTITIONER

## 2024-06-20 PROCEDURE — 87086 URINE CULTURE/COLONY COUNT: CPT

## 2024-06-20 PROCEDURE — 87186 SC STD MICRODIL/AGAR DIL: CPT

## 2024-06-20 PROCEDURE — 81002 URINALYSIS NONAUTO W/O SCOPE: CPT | Performed by: NURSE PRACTITIONER

## 2024-06-20 PROCEDURE — 87077 CULTURE AEROBIC IDENTIFY: CPT

## 2024-06-20 RX ORDER — CEFDINIR 300 MG/1
300 CAPSULE ORAL EVERY 12 HOURS
Qty: 10 CAPSULE | Refills: 0 | Status: SHIPPED | OUTPATIENT
Start: 2024-06-20 | End: 2024-06-25

## 2024-06-20 ASSESSMENT — ENCOUNTER SYMPTOMS
CHILLS: 0
DIZZINESS: 0
FLANK PAIN: 0
FEVER: 0
MYALGIAS: 1
WEAKNESS: 0
DIARRHEA: 0
VOMITING: 0
HEADACHES: 0
BACK PAIN: 1
NAUSEA: 0
ABDOMINAL PAIN: 0

## 2024-06-20 NOTE — PROGRESS NOTES
Subjective     Lizette Baez is a 48 y.o. female who presents with Dysuria (Frequent urination, hard to urinate, pressure in bladder X possibly started a couple months ago but within the last week it has been hard to urinate )            Dysuria   Associated symptoms include frequency and urgency. Pertinent negatives include no chills, flank pain, hematuria, nausea or vomiting.   Lizette has come into urgent care as she has been experience seeing UTI symptoms x 1 week.  States experiencing urinary frequency and pressure in her lower abdomen after urination.  Denies fever, nausea, vomiting or recent diarrhea.  Denies vaginal discharge, redness or itchiness.   was seen at [facility] 2 months ago and was placed on an antibiotic but states a urine culture was not sent in for verification and was told she possibly could have a urinary tract infection.  Admits to poor water drinking.     PMH:  has a past medical history of Anemia, Anesthesia, Arthritis, Dental disorder, Dysmenorrhea, and Female stress incontinence.  MEDS:   Current Outpatient Medications:     cefdinir (OMNICEF) 300 MG Cap, Take 1 Capsule by mouth every 12 hours for 5 days., Disp: 10 Capsule, Rfl: 0  ALLERGIES:   Allergies   Allergen Reactions    Pcn [Penicillins] Vomiting and Nausea     SURGHX:   Past Surgical History:   Procedure Laterality Date    PRIMARY C SECTION      x3    PRIMARY C SECTION  /         SOCHX:  reports that she has been smoking cigarettes. She has never used smokeless tobacco. She reports that she does not currently use alcohol. She reports that she does not currently use drugs after having used the following drugs: Marijuana and Inhaled.  FH: Family history was reviewed, no pertinent findings to report      Review of Systems   Constitutional:  Negative for chills, fever and malaise/fatigue.   Gastrointestinal:  Negative for abdominal pain, diarrhea, nausea and vomiting.   Genitourinary:  Positive  "for frequency and urgency. Negative for dysuria, flank pain and hematuria.   Musculoskeletal:  Positive for back pain and myalgias.   Skin:  Negative for itching and rash.   Neurological:  Negative for dizziness, weakness and headaches.   All other systems reviewed and are negative.             Objective     /70 (BP Location: Right arm, Patient Position: Sitting, BP Cuff Size: Adult)   Pulse 88   Temp 36.4 °C (97.5 °F) (Temporal)   Resp 16   Ht 1.651 m (5' 5\")   Wt 68 kg (150 lb)   SpO2 99%   BMI 24.96 kg/m²      Physical Exam  Vitals reviewed.   Constitutional:       General: She is awake. She is not in acute distress.     Appearance: Normal appearance. She is not ill-appearing, toxic-appearing or diaphoretic.   Cardiovascular:      Rate and Rhythm: Normal rate.   Pulmonary:      Effort: Pulmonary effort is normal.   Abdominal:      General: There is no distension.      Palpations: Abdomen is soft.      Tenderness: There is no abdominal tenderness. There is no right CVA tenderness, left CVA tenderness, guarding or rebound.   Skin:     General: Skin is warm and dry.   Neurological:      Mental Status: She is alert and oriented to person, place, and time.   Psychiatric:         Attention and Perception: Attention normal.         Mood and Affect: Mood normal.         Speech: Speech normal.         Behavior: Behavior normal. Behavior is cooperative.                             Assessment & Plan        1. Urinary frequency    - POCT Urinalysis    2. Acute cystitis without hematuria    - Urine Culture; Future  - cefdinir (OMNICEF) 300 MG Cap; Take 1 Capsule by mouth every 12 hours for 5 days.  Dispense: 10 Capsule; Refill: 0     -Maintain hydration/water intake  -Urinate more frequently and empty bladder completely  -Practice good toileting hygiene after bowel movements and urination  -Monitor for back/flank pain, difficulty urinating, blood in urine- need re-evaluation     MyChart release of urine culture " results with any changes, patient notified

## 2024-06-23 LAB
BACTERIA UR CULT: ABNORMAL
BACTERIA UR CULT: ABNORMAL
SIGNIFICANT IND 70042: ABNORMAL
SITE SITE: ABNORMAL
SOURCE SOURCE: ABNORMAL

## 2024-10-29 ENCOUNTER — APPOINTMENT (OUTPATIENT)
Dept: URGENT CARE | Facility: PHYSICIAN GROUP | Age: 49
End: 2024-10-29
Payer: MEDICAID

## 2025-06-27 DIAGNOSIS — Z00.6 CLINICAL TRIAL PARTICIPANT: ICD-10-CM
